# Patient Record
Sex: MALE | Race: WHITE | NOT HISPANIC OR LATINO | Employment: FULL TIME | URBAN - METROPOLITAN AREA
[De-identification: names, ages, dates, MRNs, and addresses within clinical notes are randomized per-mention and may not be internally consistent; named-entity substitution may affect disease eponyms.]

---

## 2024-02-16 ENCOUNTER — OFFICE VISIT (OUTPATIENT)
Dept: ORTHOPEDIC SURGERY | Facility: CLINIC | Age: 44
End: 2024-02-16
Payer: MEDICAID

## 2024-02-16 VITALS — HEIGHT: 70 IN | WEIGHT: 180.4 LBS | BODY MASS INDEX: 25.83 KG/M2 | TEMPERATURE: 98.6 F

## 2024-02-16 DIAGNOSIS — M87.9 OSTEONECROSIS OF BOTH HIPS: ICD-10-CM

## 2024-02-16 DIAGNOSIS — R52 PAIN: Primary | ICD-10-CM

## 2024-02-16 DIAGNOSIS — M87.9 BILATERAL HIP OSTEONECROSIS: ICD-10-CM

## 2024-02-16 PROCEDURE — 99204 OFFICE O/P NEW MOD 45 MIN: CPT | Performed by: ORTHOPAEDIC SURGERY

## 2024-06-06 DIAGNOSIS — M87.9 BILATERAL HIP OSTEONECROSIS: Primary | ICD-10-CM

## 2024-06-13 ENCOUNTER — OFFICE VISIT (OUTPATIENT)
Dept: ORTHOPEDIC SURGERY | Facility: CLINIC | Age: 44
End: 2024-06-13
Payer: COMMERCIAL

## 2024-06-13 VITALS — BODY MASS INDEX: 25.77 KG/M2 | TEMPERATURE: 98 F | WEIGHT: 180 LBS | HEIGHT: 70 IN

## 2024-06-13 DIAGNOSIS — R52 PAIN: ICD-10-CM

## 2024-06-13 DIAGNOSIS — M87.051 AVASCULAR NECROSIS OF BONE OF RIGHT HIP: Primary | ICD-10-CM

## 2024-06-13 PROCEDURE — 99214 OFFICE O/P EST MOD 30 MIN: CPT | Performed by: ORTHOPAEDIC SURGERY

## 2024-06-13 RX ORDER — PREGABALIN 150 MG/1
150 CAPSULE ORAL ONCE
OUTPATIENT
Start: 2024-06-13

## 2024-06-13 RX ORDER — MELOXICAM 7.5 MG/1
15 TABLET ORAL ONCE
OUTPATIENT
Start: 2024-06-13 | End: 2024-06-13

## 2024-06-13 RX ORDER — BUSPIRONE HYDROCHLORIDE 10 MG/1
10 TABLET ORAL
COMMUNITY
Start: 2024-04-29 | End: 2024-07-28

## 2024-06-13 RX ORDER — ACETAMINOPHEN 10 MG/ML
1000 INJECTION, SOLUTION INTRAVENOUS ONCE
OUTPATIENT
Start: 2024-06-13 | End: 2024-06-13

## 2024-06-13 RX ORDER — AMITRIPTYLINE HYDROCHLORIDE 25 MG/1
1 TABLET, FILM COATED ORAL NIGHTLY
COMMUNITY

## 2024-06-13 RX ORDER — CHLORHEXIDINE GLUCONATE 500 MG/1
CLOTH TOPICAL SEE ADMIN INSTRUCTIONS
OUTPATIENT
Start: 2024-06-13

## 2024-06-13 NOTE — PROGRESS NOTES
General Exam    Patient: Brain Ayers    YOB: 1980    Medical Record Number: [id number]    Chief Complaints: Right hip pain    History of Present Illness:     43 y.o. male patient who presents for evaluation right hip pain.  Patient states he has been seen by orthopedics previously and diagnosed with avascular necrosis of the right hip.  He reports that he did get imaging done that was an MRI at [facility] which showed avascular necrotic evidence bilateral hips with the right hip most affected.  Patient states the right hip symptoms have increased in severity and limitations.  Has generalized hip pain decreased motion and limitations in functional status due to symptoms.  At this time he feels that normal daily activities and overall quality life have been diminished because of his symptoms.  He is tried over-the-counter anti-inflammatory medications which are not relieving his symptoms.    Patient states he was a previous heavy drinker but has been sober for about 512 days.  States he is otherwise healthy but has not seen a dentist in some time and uses nicotine pouches.    Denies any numbness or tingling.  Denies any fevers, cough or shortness of breath.    Allergies: No Known Allergies    Home Medications:      Current Outpatient Medications:     amitriptyline (ELAVIL) 25 MG tablet, Take 1 tablet by mouth Every Night., Disp: , Rfl:     busPIRone (BUSPAR) 10 MG tablet, Take 1 tablet by mouth., Disp: , Rfl:     clonazePAM (KlonoPIN) 1 MG tablet, TAKE ONE TABLET BY MOUTH DAILY AS NEEDED FOR ANXIETY *MUST LAST 30 DAYS, Disp: , Rfl:     cyclobenzaprine (FLEXERIL) 10 MG tablet, One half to 1 tab PO tid PRN muscle spasm, Disp: 15 tablet, Rfl: 0    losartan (COZAAR) 50 MG tablet, Take 1 tablet by mouth Daily., Disp: , Rfl:     vitamin D3 125 MCG (5000 UT) capsule capsule, Take 1 capsule by mouth Daily., Disp: , Rfl:     vitamin E 200 UNIT capsule, Take 1 capsule by mouth Daily., Disp: , Rfl:      "QUEtiapine (SEROquel) 50 MG tablet, Take 50 mg by mouth Daily. (Patient not taking: Reported on 2/16/2024), Disp: , Rfl:     venlafaxine XR (EFFEXOR-XR) 150 MG 24 hr capsule, Take 1 capsule by mouth Daily., Disp: , Rfl:     Past Medical History:   Diagnosis Date    Anxiety     Depression     Hypertension        No past surgical history on file.    Social History     Occupational History    Not on file   Tobacco Use    Smoking status: Never     Passive exposure: Never    Smokeless tobacco: Current    Tobacco comments:     Nicotine pouch   Vaping Use    Vaping status: Never Used   Substance and Sexual Activity    Alcohol use: Not Currently    Drug use: Never    Sexual activity: Defer      Social History     Social History Narrative    Not on file       Family History   Problem Relation Age of Onset    Hypertension Mother     Arthritis Mother     No Known Problems Father        Review of Systems:      Constitutional: Denies fever, shaking or chills         All other pertinent positives and negatives as noted above in HPI.    Physical Exam: 43 y.o. male    Vitals:    06/13/24 1131   Temp: 98 °F (36.7 °C)   Weight: 81.6 kg (180 lb)   Height: 177.8 cm (70\")       General:  Patient is awake and alert.  Appears in no acute distress or discomfort.      Musculoskeletal/Extremities:    Right hip examined gentle range of motion decreased due to discomfort.  Positive Stinchfield.  Motor and sensory intact distally.  Antalgic gait unassisted.         Radiology:       Lateral films right hip taken reviewed To evaluate the patient's complaint/s.    Imaging show evidence of avascular necrosis of the right hip with evidence of subchondral collapse that seems to have progressed from previous films.          Assessment: Right hip avascular necrosis with subchondral collapse, left hip avascular necrosis      Plan:      I discussed the findings with the patient his left hip seems to be doing okay for him that will need to be monitored.  " However the right hip there is been increased in symptoms as well as changes suggestive of progression of disease of the right femoral head as noted above.  His symptoms are limiting his normal daily functions overall quality life.  At this time I feel that a total hip replacement is warranted for the patient.  I discussed surgery in detail with the patient including the use of a model as well as risk benefits.  I did discuss risk and benefits with the patient with risk including but not limited to bleeding, infection, damage to nearby nerves, vessels, tendons, ligaments, continued pain, worsening pain, fracture, dislocation, leg length discrepancy, blood clots, even death with anesthesia and possible need for future procedures surgeries.  Patient understood this and has chosen to proceed.    Plan to proceed with right total hip replacement    Patient will need to be nicotine free for weeks prior and 2 weeks after surgery    Patient will require dental clearance.    Patient has an upcoming trip and looking to be scheduled in early September at this time.      Plan for same-day/outpatient surgery.       Plan to send femoral head to pathology        We will plan for follow up as above.    All questions were answered.  Patient understands and agrees with the plan.    Valentin Buck MD    06/13/2024    CC to Dahlia Rhodes APRN

## 2024-06-24 PROBLEM — M87.051 AVASCULAR NECROSIS OF BONE OF RIGHT HIP: Status: ACTIVE | Noted: 2024-06-13

## 2024-08-12 ENCOUNTER — TELEPHONE (OUTPATIENT)
Dept: ORTHOPEDIC SURGERY | Facility: CLINIC | Age: 44
End: 2024-08-12
Payer: COMMERCIAL

## 2024-08-12 NOTE — TELEPHONE ENCOUNTER
Caller: MICHAEL   Relationship to Patient: CLEMENTE  Phone Number: [phone]   Reason for Call:   CALLING ASKING HOW MANY WEEKS OUT TO START THE BABY ASPRIN AND TO STOP THE NICOTINE PATCHES

## 2024-08-13 NOTE — TELEPHONE ENCOUNTER
Have spoke with the patient.  I clarified the patient is not on any aspirin at present.  Have advised him that the aspirin Dr. Buck talked to him about would be started postoperatively.

## 2024-08-13 NOTE — TELEPHONE ENCOUNTER
Tried to call patient and left a voicemail stating per Dr. Buck that Mr. Ayers will need to be nicotine free for four week prior to surgery and two week after surgery. I was able to ask him about the baby Asprin but patients do start baby Asprin post surgery. Patients surgery is 9-

## 2024-08-27 ENCOUNTER — PATIENT MESSAGE (OUTPATIENT)
Dept: ORTHOPEDIC SURGERY | Facility: CLINIC | Age: 44
End: 2024-08-27
Payer: COMMERCIAL

## 2024-08-27 DIAGNOSIS — M87.051 AVASCULAR NECROSIS OF BONE OF RIGHT HIP: ICD-10-CM

## 2024-08-27 DIAGNOSIS — M87.051 AVASCULAR NECROSIS OF BONE OF RIGHT HIP: Primary | ICD-10-CM

## 2024-08-28 DIAGNOSIS — M87.9 BILATERAL HIP OSTEONECROSIS: Primary | ICD-10-CM

## 2024-08-28 DIAGNOSIS — M87.051 AVASCULAR NECROSIS OF BONE OF RIGHT HIP: ICD-10-CM

## 2024-08-28 RX ORDER — TRAMADOL HYDROCHLORIDE 50 MG/1
50 TABLET ORAL EVERY 12 HOURS PRN
Qty: 10 TABLET | Refills: 0 | Status: SHIPPED | OUTPATIENT
Start: 2024-08-28

## 2024-08-28 RX ORDER — TRAMADOL HYDROCHLORIDE 50 MG/1
50 TABLET ORAL EVERY 12 HOURS PRN
Qty: 10 TABLET | Refills: 0 | Status: SHIPPED | OUTPATIENT
Start: 2024-08-28 | End: 2024-08-28 | Stop reason: SDUPTHER

## 2024-09-14 ENCOUNTER — PATIENT MESSAGE (OUTPATIENT)
Dept: ORTHOPEDIC SURGERY | Facility: CLINIC | Age: 44
End: 2024-09-14
Payer: COMMERCIAL

## 2024-09-16 ENCOUNTER — TELEPHONE (OUTPATIENT)
Dept: ORTHOPEDIC SURGERY | Facility: CLINIC | Age: 44
End: 2024-09-16
Payer: COMMERCIAL

## 2024-09-17 ENCOUNTER — OFFICE VISIT (OUTPATIENT)
Dept: ORTHOPEDIC SURGERY | Facility: CLINIC | Age: 44
End: 2024-09-17
Payer: COMMERCIAL

## 2024-09-17 ENCOUNTER — TELEPHONE (OUTPATIENT)
Dept: ORTHOPEDIC SURGERY | Facility: HOSPITAL | Age: 44
End: 2024-09-17
Payer: COMMERCIAL

## 2024-09-17 ENCOUNTER — PRE-ADMISSION TESTING (OUTPATIENT)
Dept: PREADMISSION TESTING | Facility: HOSPITAL | Age: 44
End: 2024-09-17
Payer: COMMERCIAL

## 2024-09-17 VITALS
OXYGEN SATURATION: 99 % | WEIGHT: 183.9 LBS | HEART RATE: 69 BPM | SYSTOLIC BLOOD PRESSURE: 128 MMHG | TEMPERATURE: 97.6 F | BODY MASS INDEX: 26.33 KG/M2 | DIASTOLIC BLOOD PRESSURE: 77 MMHG | RESPIRATION RATE: 16 BRPM | HEIGHT: 70 IN

## 2024-09-17 VITALS — WEIGHT: 178.3 LBS | HEIGHT: 70 IN | TEMPERATURE: 98.6 F | BODY MASS INDEX: 25.53 KG/M2

## 2024-09-17 DIAGNOSIS — M87.051 AVASCULAR NECROSIS OF BONE OF RIGHT HIP: ICD-10-CM

## 2024-09-17 DIAGNOSIS — M25.561 RIGHT KNEE PAIN, UNSPECIFIED CHRONICITY: Primary | ICD-10-CM

## 2024-09-17 LAB
ABO GROUP BLD: NORMAL
ANION GAP SERPL CALCULATED.3IONS-SCNC: 6 MMOL/L (ref 5–15)
BLD GP AB SCN SERPL QL: NEGATIVE
BUN SERPL-MCNC: 11 MG/DL (ref 6–20)
BUN/CREAT SERPL: 13.3 (ref 7–25)
CALCIUM SPEC-SCNC: 9.6 MG/DL (ref 8.6–10.5)
CHLORIDE SERPL-SCNC: 103 MMOL/L (ref 98–107)
CO2 SERPL-SCNC: 30 MMOL/L (ref 22–29)
CREAT SERPL-MCNC: 0.83 MG/DL (ref 0.76–1.27)
DEPRECATED RDW RBC AUTO: 37.6 FL (ref 37–54)
EGFRCR SERPLBLD CKD-EPI 2021: 111.4 ML/MIN/1.73
ERYTHROCYTE [DISTWIDTH] IN BLOOD BY AUTOMATED COUNT: 11.4 % (ref 12.3–15.4)
GLUCOSE SERPL-MCNC: 97 MG/DL (ref 65–99)
HBA1C MFR BLD: 5.6 % (ref 4.8–5.6)
HCT VFR BLD AUTO: 41.9 % (ref 37.5–51)
HGB BLD-MCNC: 13.7 G/DL (ref 13–17.7)
MCH RBC QN AUTO: 29.8 PG (ref 26.6–33)
MCHC RBC AUTO-ENTMCNC: 32.7 G/DL (ref 31.5–35.7)
MCV RBC AUTO: 91.1 FL (ref 79–97)
PLATELET # BLD AUTO: 273 10*3/MM3 (ref 140–450)
PMV BLD AUTO: 9.5 FL (ref 6–12)
POTASSIUM SERPL-SCNC: 4.6 MMOL/L (ref 3.5–5.2)
QT INTERVAL: 395 MS
QTC INTERVAL: 417 MS
RBC # BLD AUTO: 4.6 10*6/MM3 (ref 4.14–5.8)
RH BLD: POSITIVE
SODIUM SERPL-SCNC: 139 MMOL/L (ref 136–145)
T&S EXPIRATION DATE: NORMAL
WBC NRBC COR # BLD AUTO: 5.05 10*3/MM3 (ref 3.4–10.8)

## 2024-09-17 PROCEDURE — G0480 DRUG TEST DEF 1-7 CLASSES: HCPCS

## 2024-09-17 PROCEDURE — 85027 COMPLETE CBC AUTOMATED: CPT

## 2024-09-17 PROCEDURE — 83036 HEMOGLOBIN GLYCOSYLATED A1C: CPT

## 2024-09-17 PROCEDURE — 36415 COLL VENOUS BLD VENIPUNCTURE: CPT

## 2024-09-17 PROCEDURE — 80048 BASIC METABOLIC PNL TOTAL CA: CPT

## 2024-09-17 PROCEDURE — 86900 BLOOD TYPING SEROLOGIC ABO: CPT

## 2024-09-17 PROCEDURE — 86850 RBC ANTIBODY SCREEN: CPT

## 2024-09-17 PROCEDURE — 86901 BLOOD TYPING SEROLOGIC RH(D): CPT

## 2024-09-17 PROCEDURE — 99214 OFFICE O/P EST MOD 30 MIN: CPT | Performed by: ORTHOPAEDIC SURGERY

## 2024-09-17 PROCEDURE — 73562 X-RAY EXAM OF KNEE 3: CPT | Performed by: ORTHOPAEDIC SURGERY

## 2024-09-17 PROCEDURE — 93005 ELECTROCARDIOGRAM TRACING: CPT

## 2024-09-17 PROCEDURE — 20610 DRAIN/INJ JOINT/BURSA W/O US: CPT | Performed by: ORTHOPAEDIC SURGERY

## 2024-09-17 RX ORDER — LIDOCAINE HYDROCHLORIDE 10 MG/ML
2 INJECTION, SOLUTION EPIDURAL; INFILTRATION; INTRACAUDAL; PERINEURAL
Status: COMPLETED | OUTPATIENT
Start: 2024-09-17 | End: 2024-09-17

## 2024-09-17 RX ORDER — METHYLPREDNISOLONE ACETATE 80 MG/ML
80 INJECTION, SUSPENSION INTRA-ARTICULAR; INTRALESIONAL; INTRAMUSCULAR; SOFT TISSUE
Status: COMPLETED | OUTPATIENT
Start: 2024-09-17 | End: 2024-09-17

## 2024-09-17 RX ORDER — AMITRIPTYLINE HYDROCHLORIDE 10 MG/1
10-20 TABLET ORAL DAILY
COMMUNITY
Start: 2024-07-18 | End: 2025-07-18

## 2024-09-17 RX ORDER — ACETAMINOPHEN 500 MG
500 TABLET ORAL EVERY 6 HOURS PRN
COMMUNITY

## 2024-09-17 RX ORDER — NAPROXEN SODIUM 220 MG
220 TABLET ORAL 2 TIMES DAILY WITH MEALS
COMMUNITY

## 2024-09-17 RX ADMIN — METHYLPREDNISOLONE ACETATE 80 MG: 80 INJECTION, SUSPENSION INTRA-ARTICULAR; INTRALESIONAL; INTRAMUSCULAR; SOFT TISSUE at 14:05

## 2024-09-17 RX ADMIN — LIDOCAINE HYDROCHLORIDE 2 ML: 10 INJECTION, SOLUTION EPIDURAL; INFILTRATION; INTRACAUDAL; PERINEURAL at 14:05

## 2024-09-23 ENCOUNTER — OFFICE VISIT (OUTPATIENT)
Dept: ORTHOPEDIC SURGERY | Facility: CLINIC | Age: 44
End: 2024-09-23
Payer: COMMERCIAL

## 2024-09-23 VITALS — BODY MASS INDEX: 25.83 KG/M2 | WEIGHT: 180.4 LBS | HEIGHT: 70 IN | TEMPERATURE: 98.9 F

## 2024-09-23 DIAGNOSIS — M87.051 AVASCULAR NECROSIS OF BONE OF RIGHT HIP: Primary | ICD-10-CM

## 2024-09-23 LAB
COTININE SERPL-MCNC: <1 NG/ML
NICOTINE SERPL-MCNC: <1 NG/ML

## 2024-09-23 PROCEDURE — S0260 H&P FOR SURGERY: HCPCS | Performed by: ORTHOPAEDIC SURGERY

## 2024-09-25 ENCOUNTER — HOME HEALTH ADMISSION (OUTPATIENT)
Dept: HOME HEALTH SERVICES | Facility: HOME HEALTHCARE | Age: 44
End: 2024-09-25
Payer: COMMERCIAL

## 2024-09-25 ENCOUNTER — APPOINTMENT (OUTPATIENT)
Dept: GENERAL RADIOLOGY | Facility: HOSPITAL | Age: 44
End: 2024-09-25
Payer: COMMERCIAL

## 2024-09-25 ENCOUNTER — DOCUMENTATION (OUTPATIENT)
Dept: HOME HEALTH SERVICES | Facility: HOME HEALTHCARE | Age: 44
End: 2024-09-25
Payer: COMMERCIAL

## 2024-09-25 ENCOUNTER — ANESTHESIA EVENT (OUTPATIENT)
Dept: PERIOP | Facility: HOSPITAL | Age: 44
End: 2024-09-25
Payer: COMMERCIAL

## 2024-09-25 ENCOUNTER — ANESTHESIA (OUTPATIENT)
Dept: PERIOP | Facility: HOSPITAL | Age: 44
End: 2024-09-25
Payer: COMMERCIAL

## 2024-09-25 ENCOUNTER — NURSE TRIAGE (OUTPATIENT)
Dept: CALL CENTER | Facility: HOSPITAL | Age: 44
End: 2024-09-25
Payer: COMMERCIAL

## 2024-09-25 ENCOUNTER — HOSPITAL ENCOUNTER (OUTPATIENT)
Facility: HOSPITAL | Age: 44
Setting detail: HOSPITAL OUTPATIENT SURGERY
Discharge: HOME | End: 2024-09-25
Attending: ORTHOPAEDIC SURGERY | Admitting: ORTHOPAEDIC SURGERY
Payer: COMMERCIAL

## 2024-09-25 VITALS
RESPIRATION RATE: 18 BRPM | OXYGEN SATURATION: 97 % | DIASTOLIC BLOOD PRESSURE: 66 MMHG | TEMPERATURE: 97.8 F | SYSTOLIC BLOOD PRESSURE: 122 MMHG | HEART RATE: 80 BPM

## 2024-09-25 DIAGNOSIS — M87.051 AVASCULAR NECROSIS OF BONE OF RIGHT HIP: Primary | ICD-10-CM

## 2024-09-25 PROCEDURE — 25010000002 CEFAZOLIN PER 500 MG: Performed by: ORTHOPAEDIC SURGERY

## 2024-09-25 PROCEDURE — 25010000002 SUGAMMADEX 200 MG/2ML SOLUTION: Performed by: NURSE ANESTHETIST, CERTIFIED REGISTERED

## 2024-09-25 PROCEDURE — 25010000002 MAGNESIUM SULFATE PER 500 MG OF MAGNESIUM: Performed by: NURSE ANESTHETIST, CERTIFIED REGISTERED

## 2024-09-25 PROCEDURE — 25010000002 VANCOMYCIN HCL 1.25 G RECONSTITUTED SOLUTION 1 EACH VIAL: Performed by: ORTHOPAEDIC SURGERY

## 2024-09-25 PROCEDURE — C1776 JOINT DEVICE (IMPLANTABLE): HCPCS | Performed by: ORTHOPAEDIC SURGERY

## 2024-09-25 PROCEDURE — 97161 PT EVAL LOW COMPLEX 20 MIN: CPT

## 2024-09-25 PROCEDURE — 25010000002 GLYCOPYRROLATE 1 MG/5ML SOLUTION: Performed by: NURSE ANESTHETIST, CERTIFIED REGISTERED

## 2024-09-25 PROCEDURE — 25010000002 PROPOFOL 10 MG/ML EMULSION: Performed by: NURSE ANESTHETIST, CERTIFIED REGISTERED

## 2024-09-25 PROCEDURE — 25010000002 FENTANYL CITRATE (PF) 50 MCG/ML SOLUTION: Performed by: NURSE ANESTHETIST, CERTIFIED REGISTERED

## 2024-09-25 PROCEDURE — 25810000003 LACTATED RINGERS SOLUTION: Performed by: ORTHOPAEDIC SURGERY

## 2024-09-25 PROCEDURE — 88311 DECALCIFY TISSUE: CPT | Performed by: ORTHOPAEDIC SURGERY

## 2024-09-25 PROCEDURE — 25810000003 SODIUM CHLORIDE 0.9 % SOLUTION 250 ML FLEX CONT: Performed by: ORTHOPAEDIC SURGERY

## 2024-09-25 PROCEDURE — 73501 X-RAY EXAM HIP UNI 1 VIEW: CPT

## 2024-09-25 PROCEDURE — 25010000002 ROPIVACAINE PER 1 MG: Performed by: ORTHOPAEDIC SURGERY

## 2024-09-25 PROCEDURE — 88304 TISSUE EXAM BY PATHOLOGIST: CPT | Performed by: ORTHOPAEDIC SURGERY

## 2024-09-25 PROCEDURE — 25810000003 LACTATED RINGERS PER 1000 ML: Performed by: NURSE ANESTHETIST, CERTIFIED REGISTERED

## 2024-09-25 PROCEDURE — 25010000002 HYDROMORPHONE 1 MG/ML SOLUTION: Performed by: NURSE ANESTHETIST, CERTIFIED REGISTERED

## 2024-09-25 PROCEDURE — 25010000002 ACETAMINOPHEN 10 MG/ML SOLUTION: Performed by: NURSE ANESTHETIST, CERTIFIED REGISTERED

## 2024-09-25 PROCEDURE — 76000 FLUOROSCOPY <1 HR PHYS/QHP: CPT

## 2024-09-25 PROCEDURE — S0260 H&P FOR SURGERY: HCPCS | Performed by: ORTHOPAEDIC SURGERY

## 2024-09-25 PROCEDURE — 27130 TOTAL HIP ARTHROPLASTY: CPT | Performed by: ORTHOPAEDIC SURGERY

## 2024-09-25 PROCEDURE — 25010000002 MIDAZOLAM PER 1 MG: Performed by: STUDENT IN AN ORGANIZED HEALTH CARE EDUCATION/TRAINING PROGRAM

## 2024-09-25 PROCEDURE — 25010000002 KETOROLAC TROMETHAMINE PER 15 MG: Performed by: ORTHOPAEDIC SURGERY

## 2024-09-25 PROCEDURE — 25010000002 HYDROMORPHONE PER 4 MG: Performed by: NURSE ANESTHETIST, CERTIFIED REGISTERED

## 2024-09-25 PROCEDURE — 27130 TOTAL HIP ARTHROPLASTY: CPT | Performed by: TECHNICIAN, OTHER

## 2024-09-25 PROCEDURE — 97116 GAIT TRAINING THERAPY: CPT

## 2024-09-25 PROCEDURE — 25010000002 ONDANSETRON PER 1 MG: Performed by: NURSE ANESTHETIST, CERTIFIED REGISTERED

## 2024-09-25 PROCEDURE — 25010000002 EPINEPHRINE 1 MG/ML SOLUTION 30 ML VIAL: Performed by: ORTHOPAEDIC SURGERY

## 2024-09-25 PROCEDURE — 25010000002 DEXAMETHASONE SODIUM PHOSPHATE 20 MG/5ML SOLUTION: Performed by: NURSE ANESTHETIST, CERTIFIED REGISTERED

## 2024-09-25 PROCEDURE — 25010000002 MORPHINE PER 10 MG: Performed by: ORTHOPAEDIC SURGERY

## 2024-09-25 DEVICE — STEM FEM/HIP AVENIRCOMPLETE COLAR STFF HA SZ5: Type: IMPLANTABLE DEVICE | Site: HIP | Status: FUNCTIONAL

## 2024-09-25 DEVICE — BIOLOX® DELTA, CERAMIC FEMORAL HEAD, L, Ø 36/+3.5, TAPER 12/14
Type: IMPLANTABLE DEVICE | Site: HIP | Status: FUNCTIONAL
Brand: BIOLOX® DELTA

## 2024-09-25 DEVICE — KNOTLESS TISSUE CONTROL DEVICE, UNDYED UNIDIRECTIONAL (ANTIBACTERIAL) SYNTHETIC ABSORBABLE DEVICE
Type: IMPLANTABLE DEVICE | Site: HIP | Status: FUNCTIONAL
Brand: STRATAFIX

## 2024-09-25 DEVICE — CP HIP UPCHRG OSSEOTI LTD HL CUPS: Type: IMPLANTABLE DEVICE | Site: HIP | Status: FUNCTIONAL

## 2024-09-25 DEVICE — IMPLANTABLE DEVICE
Type: IMPLANTABLE DEVICE | Site: HIP | Status: FUNCTIONAL
Brand: G7® VIVACIT-E®

## 2024-09-25 DEVICE — KNOTLESS TISSUE CONTROL DEVICE, VIOLET UNIDIRECTIONAL (ANTIBACTERIAL) SYNTHETIC ABSORBABLE DEVICE
Type: IMPLANTABLE DEVICE | Site: HIP | Status: FUNCTIONAL
Brand: STRATAFIX

## 2024-09-25 DEVICE — TOTAL HIP PRIMARY: Type: IMPLANTABLE DEVICE | Status: FUNCTIONAL

## 2024-09-25 DEVICE — IMPLANTABLE DEVICE
Type: IMPLANTABLE DEVICE | Site: HIP | Status: FUNCTIONAL
Brand: G7® ACETABULAR SYSTEM

## 2024-09-25 RX ORDER — PHENYLEPHRINE HCL IN 0.9% NACL 1 MG/10 ML
SYRINGE (ML) INTRAVENOUS AS NEEDED
Status: DISCONTINUED | OUTPATIENT
Start: 2024-09-25 | End: 2024-09-25 | Stop reason: SURG

## 2024-09-25 RX ORDER — FENTANYL CITRATE 50 UG/ML
INJECTION, SOLUTION INTRAMUSCULAR; INTRAVENOUS AS NEEDED
Status: DISCONTINUED | OUTPATIENT
Start: 2024-09-25 | End: 2024-09-25 | Stop reason: SURG

## 2024-09-25 RX ORDER — PROMETHAZINE HYDROCHLORIDE 25 MG/1
25 SUPPOSITORY RECTAL ONCE AS NEEDED
Status: DISCONTINUED | OUTPATIENT
Start: 2024-09-25 | End: 2024-09-26 | Stop reason: HOSPADM

## 2024-09-25 RX ORDER — FENTANYL CITRATE 50 UG/ML
50 INJECTION, SOLUTION INTRAMUSCULAR; INTRAVENOUS ONCE AS NEEDED
Status: DISCONTINUED | OUTPATIENT
Start: 2024-09-25 | End: 2024-09-25 | Stop reason: HOSPADM

## 2024-09-25 RX ORDER — HYDROCODONE BITARTRATE AND ACETAMINOPHEN 5; 325 MG/1; MG/1
1 TABLET ORAL ONCE AS NEEDED
Status: DISCONTINUED | OUTPATIENT
Start: 2024-09-25 | End: 2024-09-26 | Stop reason: HOSPADM

## 2024-09-25 RX ORDER — NALOXONE HCL 0.4 MG/ML
0.2 VIAL (ML) INJECTION AS NEEDED
Status: DISCONTINUED | OUTPATIENT
Start: 2024-09-25 | End: 2024-09-26 | Stop reason: HOSPADM

## 2024-09-25 RX ORDER — SODIUM CHLORIDE 0.9 % (FLUSH) 0.9 %
3-10 SYRINGE (ML) INJECTION AS NEEDED
Status: DISCONTINUED | OUTPATIENT
Start: 2024-09-25 | End: 2024-09-25 | Stop reason: HOSPADM

## 2024-09-25 RX ORDER — HYDROCODONE BITARTRATE AND ACETAMINOPHEN 7.5; 325 MG/1; MG/1
2 TABLET ORAL EVERY 4 HOURS PRN
Status: CANCELLED | OUTPATIENT
Start: 2024-09-25 | End: 2024-10-02

## 2024-09-25 RX ORDER — HYDROCODONE BITARTRATE AND ACETAMINOPHEN 7.5; 325 MG/1; MG/1
1-2 TABLET ORAL EVERY 4 HOURS PRN
Qty: 28 TABLET | Refills: 0 | Status: SHIPPED | OUTPATIENT
Start: 2024-09-25

## 2024-09-25 RX ORDER — ASPIRIN 81 MG/1
81 TABLET ORAL 2 TIMES DAILY
Qty: 60 TABLET | Refills: 0 | Status: SHIPPED | OUTPATIENT
Start: 2024-09-26 | End: 2024-10-26

## 2024-09-25 RX ORDER — MAGNESIUM SULFATE HEPTAHYDRATE 500 MG/ML
INJECTION, SOLUTION INTRAMUSCULAR; INTRAVENOUS AS NEEDED
Status: DISCONTINUED | OUTPATIENT
Start: 2024-09-25 | End: 2024-09-25 | Stop reason: SURG

## 2024-09-25 RX ORDER — SODIUM CHLORIDE, SODIUM LACTATE, POTASSIUM CHLORIDE, CALCIUM CHLORIDE 600; 310; 30; 20 MG/100ML; MG/100ML; MG/100ML; MG/100ML
INJECTION, SOLUTION INTRAVENOUS CONTINUOUS PRN
Status: DISCONTINUED | OUTPATIENT
Start: 2024-09-25 | End: 2024-09-25 | Stop reason: SURG

## 2024-09-25 RX ORDER — HYDRALAZINE HYDROCHLORIDE 20 MG/ML
5 INJECTION INTRAMUSCULAR; INTRAVENOUS
Status: DISCONTINUED | OUTPATIENT
Start: 2024-09-25 | End: 2024-09-26 | Stop reason: HOSPADM

## 2024-09-25 RX ORDER — FENTANYL CITRATE 50 UG/ML
50 INJECTION, SOLUTION INTRAMUSCULAR; INTRAVENOUS
Status: DISCONTINUED | OUTPATIENT
Start: 2024-09-25 | End: 2024-09-26 | Stop reason: HOSPADM

## 2024-09-25 RX ORDER — LABETALOL HYDROCHLORIDE 5 MG/ML
5 INJECTION, SOLUTION INTRAVENOUS
Status: DISCONTINUED | OUTPATIENT
Start: 2024-09-25 | End: 2024-09-26 | Stop reason: HOSPADM

## 2024-09-25 RX ORDER — MIDAZOLAM HYDROCHLORIDE 1 MG/ML
1 INJECTION INTRAMUSCULAR; INTRAVENOUS
Status: DISCONTINUED | OUTPATIENT
Start: 2024-09-25 | End: 2024-09-25 | Stop reason: HOSPADM

## 2024-09-25 RX ORDER — DIPHENHYDRAMINE HYDROCHLORIDE 50 MG/ML
12.5 INJECTION INTRAMUSCULAR; INTRAVENOUS
Status: DISCONTINUED | OUTPATIENT
Start: 2024-09-25 | End: 2024-09-26 | Stop reason: HOSPADM

## 2024-09-25 RX ORDER — LIDOCAINE HYDROCHLORIDE 10 MG/ML
0.5 INJECTION, SOLUTION INFILTRATION; PERINEURAL ONCE AS NEEDED
Status: DISCONTINUED | OUTPATIENT
Start: 2024-09-25 | End: 2024-09-25 | Stop reason: HOSPADM

## 2024-09-25 RX ORDER — SODIUM CHLORIDE, SODIUM LACTATE, POTASSIUM CHLORIDE, CALCIUM CHLORIDE 600; 310; 30; 20 MG/100ML; MG/100ML; MG/100ML; MG/100ML
9 INJECTION, SOLUTION INTRAVENOUS CONTINUOUS
Status: DISCONTINUED | OUTPATIENT
Start: 2024-09-25 | End: 2024-09-26 | Stop reason: HOSPADM

## 2024-09-25 RX ORDER — MELOXICAM 15 MG/1
15 TABLET ORAL ONCE
Status: COMPLETED | OUTPATIENT
Start: 2024-09-25 | End: 2024-09-25

## 2024-09-25 RX ORDER — ONDANSETRON 2 MG/ML
INJECTION INTRAMUSCULAR; INTRAVENOUS AS NEEDED
Status: DISCONTINUED | OUTPATIENT
Start: 2024-09-25 | End: 2024-09-25 | Stop reason: SURG

## 2024-09-25 RX ORDER — MELOXICAM 15 MG/1
15 TABLET ORAL DAILY
Status: CANCELLED | OUTPATIENT
Start: 2024-09-25

## 2024-09-25 RX ORDER — PROMETHAZINE HYDROCHLORIDE 25 MG/1
25 TABLET ORAL ONCE AS NEEDED
Status: DISCONTINUED | OUTPATIENT
Start: 2024-09-25 | End: 2024-09-26 | Stop reason: HOSPADM

## 2024-09-25 RX ORDER — LIDOCAINE HYDROCHLORIDE 20 MG/ML
INJECTION, SOLUTION INFILTRATION; PERINEURAL AS NEEDED
Status: DISCONTINUED | OUTPATIENT
Start: 2024-09-25 | End: 2024-09-25 | Stop reason: SURG

## 2024-09-25 RX ORDER — POLYETHYLENE GLYCOL 3350 17 G/17G
17 POWDER, FOR SOLUTION ORAL 2 TIMES DAILY
Qty: 238 G | Refills: 0 | Status: SHIPPED | OUTPATIENT
Start: 2024-09-25 | End: 2024-10-02

## 2024-09-25 RX ORDER — ONDANSETRON 4 MG/1
4 TABLET, FILM COATED ORAL EVERY 8 HOURS PRN
Qty: 8 TABLET | Refills: 0 | Status: SHIPPED | OUTPATIENT
Start: 2024-09-25

## 2024-09-25 RX ORDER — GLYCOPYRROLATE 0.2 MG/ML
INJECTION INTRAMUSCULAR; INTRAVENOUS AS NEEDED
Status: DISCONTINUED | OUTPATIENT
Start: 2024-09-25 | End: 2024-09-25 | Stop reason: SURG

## 2024-09-25 RX ORDER — ASPIRIN 81 MG/1
81 TABLET ORAL EVERY 12 HOURS SCHEDULED
Status: CANCELLED | OUTPATIENT
Start: 2024-09-26

## 2024-09-25 RX ORDER — SODIUM CHLORIDE 0.9 % (FLUSH) 0.9 %
3 SYRINGE (ML) INJECTION EVERY 12 HOURS SCHEDULED
Status: DISCONTINUED | OUTPATIENT
Start: 2024-09-25 | End: 2024-09-25 | Stop reason: HOSPADM

## 2024-09-25 RX ORDER — PROPOFOL 10 MG/ML
VIAL (ML) INTRAVENOUS AS NEEDED
Status: DISCONTINUED | OUTPATIENT
Start: 2024-09-25 | End: 2024-09-25 | Stop reason: SURG

## 2024-09-25 RX ORDER — DEXAMETHASONE SODIUM PHOSPHATE 4 MG/ML
INJECTION, SOLUTION INTRA-ARTICULAR; INTRALESIONAL; INTRAMUSCULAR; INTRAVENOUS; SOFT TISSUE AS NEEDED
Status: DISCONTINUED | OUTPATIENT
Start: 2024-09-25 | End: 2024-09-25 | Stop reason: SURG

## 2024-09-25 RX ORDER — MAGNESIUM HYDROXIDE 1200 MG/15ML
LIQUID ORAL AS NEEDED
Status: DISCONTINUED | OUTPATIENT
Start: 2024-09-25 | End: 2024-09-25 | Stop reason: HOSPADM

## 2024-09-25 RX ORDER — OXYCODONE AND ACETAMINOPHEN 7.5; 325 MG/1; MG/1
1 TABLET ORAL EVERY 4 HOURS PRN
Status: DISCONTINUED | OUTPATIENT
Start: 2024-09-25 | End: 2024-09-26 | Stop reason: HOSPADM

## 2024-09-25 RX ORDER — TRANEXAMIC ACID 100 MG/ML
INJECTION, SOLUTION INTRAVENOUS AS NEEDED
Status: DISCONTINUED | OUTPATIENT
Start: 2024-09-25 | End: 2024-09-25 | Stop reason: SURG

## 2024-09-25 RX ORDER — ROCURONIUM BROMIDE 10 MG/ML
INJECTION, SOLUTION INTRAVENOUS AS NEEDED
Status: DISCONTINUED | OUTPATIENT
Start: 2024-09-25 | End: 2024-09-25 | Stop reason: SURG

## 2024-09-25 RX ORDER — ACETAMINOPHEN 10 MG/ML
INJECTION, SOLUTION INTRAVENOUS AS NEEDED
Status: DISCONTINUED | OUTPATIENT
Start: 2024-09-25 | End: 2024-09-25 | Stop reason: SURG

## 2024-09-25 RX ORDER — PREGABALIN 75 MG/1
150 CAPSULE ORAL ONCE
Status: COMPLETED | OUTPATIENT
Start: 2024-09-25 | End: 2024-09-25

## 2024-09-25 RX ORDER — HYDROMORPHONE HYDROCHLORIDE 1 MG/ML
0.5 INJECTION, SOLUTION INTRAMUSCULAR; INTRAVENOUS; SUBCUTANEOUS
Status: DISCONTINUED | OUTPATIENT
Start: 2024-09-25 | End: 2024-09-26 | Stop reason: HOSPADM

## 2024-09-25 RX ORDER — DROPERIDOL 2.5 MG/ML
0.62 INJECTION, SOLUTION INTRAMUSCULAR; INTRAVENOUS
Status: DISCONTINUED | OUTPATIENT
Start: 2024-09-25 | End: 2024-09-26 | Stop reason: HOSPADM

## 2024-09-25 RX ORDER — MELOXICAM 15 MG/1
15 TABLET ORAL DAILY
Qty: 10 TABLET | Refills: 0 | Status: SHIPPED | OUTPATIENT
Start: 2024-09-25 | End: 2024-10-05

## 2024-09-25 RX ORDER — PANTOPRAZOLE SODIUM 40 MG/1
40 TABLET, DELAYED RELEASE ORAL DAILY
Qty: 10 TABLET | Refills: 0 | Status: SHIPPED | OUTPATIENT
Start: 2024-09-25 | End: 2024-10-05

## 2024-09-25 RX ORDER — IPRATROPIUM BROMIDE AND ALBUTEROL SULFATE 2.5; .5 MG/3ML; MG/3ML
3 SOLUTION RESPIRATORY (INHALATION) ONCE AS NEEDED
Status: DISCONTINUED | OUTPATIENT
Start: 2024-09-25 | End: 2024-09-26 | Stop reason: HOSPADM

## 2024-09-25 RX ORDER — DOCUSATE SODIUM 100 MG/1
100 CAPSULE, LIQUID FILLED ORAL 2 TIMES DAILY
Qty: 60 CAPSULE | Refills: 0 | Status: SHIPPED | OUTPATIENT
Start: 2024-09-25

## 2024-09-25 RX ORDER — EPHEDRINE SULFATE 50 MG/ML
5 INJECTION, SOLUTION INTRAVENOUS ONCE AS NEEDED
Status: DISCONTINUED | OUTPATIENT
Start: 2024-09-25 | End: 2024-09-26 | Stop reason: HOSPADM

## 2024-09-25 RX ORDER — FLUMAZENIL 0.1 MG/ML
0.2 INJECTION INTRAVENOUS AS NEEDED
Status: DISCONTINUED | OUTPATIENT
Start: 2024-09-25 | End: 2024-09-26 | Stop reason: HOSPADM

## 2024-09-25 RX ORDER — HYDROCODONE BITARTRATE AND ACETAMINOPHEN 7.5; 325 MG/1; MG/1
1 TABLET ORAL EVERY 4 HOURS PRN
Status: CANCELLED | OUTPATIENT
Start: 2024-09-25 | End: 2024-10-02

## 2024-09-25 RX ORDER — ONDANSETRON 2 MG/ML
4 INJECTION INTRAMUSCULAR; INTRAVENOUS ONCE AS NEEDED
Status: DISCONTINUED | OUTPATIENT
Start: 2024-09-25 | End: 2024-09-26 | Stop reason: HOSPADM

## 2024-09-25 RX ADMIN — Medication 150 MCG: at 08:09

## 2024-09-25 RX ADMIN — HYDROMORPHONE HYDROCHLORIDE 0.5 MG: 1 INJECTION, SOLUTION INTRAMUSCULAR; INTRAVENOUS; SUBCUTANEOUS at 10:04

## 2024-09-25 RX ADMIN — GLYCOPYRROLATE 0.1 MG: 0.2 INJECTION INTRAMUSCULAR; INTRAVENOUS at 07:27

## 2024-09-25 RX ADMIN — MAGNESIUM SULFATE HEPTAHYDRATE 2 G: 500 INJECTION, SOLUTION INTRAMUSCULAR; INTRAVENOUS at 07:31

## 2024-09-25 RX ADMIN — MELOXICAM 15 MG: 15 TABLET ORAL at 05:52

## 2024-09-25 RX ADMIN — Medication 150 MCG: at 08:34

## 2024-09-25 RX ADMIN — ROCURONIUM BROMIDE 50 MG: 10 INJECTION, SOLUTION INTRAVENOUS at 07:18

## 2024-09-25 RX ADMIN — PREGABALIN 150 MG: 75 CAPSULE ORAL at 05:52

## 2024-09-25 RX ADMIN — HYDROMORPHONE HYDROCHLORIDE 0.5 MG: 1 INJECTION, SOLUTION INTRAMUSCULAR; INTRAVENOUS; SUBCUTANEOUS at 07:18

## 2024-09-25 RX ADMIN — ROCURONIUM BROMIDE 10 MG: 10 INJECTION, SOLUTION INTRAVENOUS at 07:55

## 2024-09-25 RX ADMIN — FENTANYL CITRATE 50 MCG: 50 INJECTION, SOLUTION INTRAMUSCULAR; INTRAVENOUS at 09:33

## 2024-09-25 RX ADMIN — SODIUM CHLORIDE 2 G: 900 INJECTION INTRAVENOUS at 07:04

## 2024-09-25 RX ADMIN — SODIUM CHLORIDE, POTASSIUM CHLORIDE, SODIUM LACTATE AND CALCIUM CHLORIDE 500 ML: 600; 310; 30; 20 INJECTION, SOLUTION INTRAVENOUS at 06:08

## 2024-09-25 RX ADMIN — PROPOFOL 200 MG: 10 INJECTION, EMULSION INTRAVENOUS at 07:18

## 2024-09-25 RX ADMIN — LIDOCAINE HYDROCHLORIDE 100 MG: 20 INJECTION, SOLUTION EPIDURAL; INFILTRATION; INTRACAUDAL; PERINEURAL at 07:18

## 2024-09-25 RX ADMIN — DEXAMETHASONE SODIUM PHOSPHATE 8 MG: 4 INJECTION, SOLUTION INTRAMUSCULAR; INTRAVENOUS at 07:27

## 2024-09-25 RX ADMIN — ACETAMINOPHEN 1000 MG: 10 INJECTION INTRAVENOUS at 07:30

## 2024-09-25 RX ADMIN — HYDROMORPHONE HYDROCHLORIDE 0.5 MG: 1 INJECTION, SOLUTION INTRAMUSCULAR; INTRAVENOUS; SUBCUTANEOUS at 08:52

## 2024-09-25 RX ADMIN — ROCURONIUM BROMIDE 10 MG: 10 INJECTION, SOLUTION INTRAVENOUS at 08:17

## 2024-09-25 RX ADMIN — ONDANSETRON 4 MG: 2 INJECTION INTRAMUSCULAR; INTRAVENOUS at 09:33

## 2024-09-25 RX ADMIN — MIDAZOLAM 1 MG: 1 INJECTION INTRAMUSCULAR; INTRAVENOUS at 06:20

## 2024-09-25 RX ADMIN — OXYCODONE HYDROCHLORIDE AND ACETAMINOPHEN 1 TABLET: 7.5; 325 TABLET ORAL at 09:58

## 2024-09-25 RX ADMIN — Medication 150 MCG: at 08:38

## 2024-09-25 RX ADMIN — TRANEXAMIC ACID 1000 MG: 1 INJECTION, SOLUTION INTRAVENOUS at 09:05

## 2024-09-25 RX ADMIN — SUGAMMADEX 200 MG: 100 INJECTION, SOLUTION INTRAVENOUS at 09:33

## 2024-09-25 RX ADMIN — FENTANYL CITRATE 50 MCG: 50 INJECTION, SOLUTION INTRAMUSCULAR; INTRAVENOUS at 09:58

## 2024-09-25 RX ADMIN — HYDROMORPHONE HYDROCHLORIDE 0.5 MG: 1 INJECTION, SOLUTION INTRAMUSCULAR; INTRAVENOUS; SUBCUTANEOUS at 10:16

## 2024-09-25 RX ADMIN — SODIUM CHLORIDE, SODIUM LACTATE, POTASSIUM CHLORIDE, AND CALCIUM CHLORIDE: 600; 310; 30; 20 INJECTION, SOLUTION INTRAVENOUS at 07:11

## 2024-09-25 RX ADMIN — TRANEXAMIC ACID 1000 MG: 1 INJECTION, SOLUTION INTRAVENOUS at 07:44

## 2024-09-25 RX ADMIN — VANCOMYCIN HYDROCHLORIDE 1250 MG: 1.25 INJECTION, POWDER, LYOPHILIZED, FOR SOLUTION INTRAVENOUS at 06:26

## 2024-09-25 RX ADMIN — PROPOFOL 25 MCG/KG/MIN: 10 INJECTION, EMULSION INTRAVENOUS at 07:35

## 2024-09-26 ENCOUNTER — TELEPHONE (OUTPATIENT)
Dept: ORTHOPEDIC SURGERY | Facility: CLINIC | Age: 44
End: 2024-09-26

## 2024-09-26 ENCOUNTER — HOME CARE VISIT (OUTPATIENT)
Dept: HOME HEALTH SERVICES | Facility: HOME HEALTHCARE | Age: 44
End: 2024-09-26
Payer: COMMERCIAL

## 2024-09-26 VITALS
OXYGEN SATURATION: 98 % | DIASTOLIC BLOOD PRESSURE: 69 MMHG | SYSTOLIC BLOOD PRESSURE: 126 MMHG | HEART RATE: 97 BPM | TEMPERATURE: 97.1 F

## 2024-09-26 DIAGNOSIS — Z96.641 STATUS POST TOTAL REPLACEMENT OF RIGHT HIP: Primary | ICD-10-CM

## 2024-09-26 PROCEDURE — G0151 HHCP-SERV OF PT,EA 15 MIN: HCPCS

## 2024-09-26 RX ORDER — OXYCODONE AND ACETAMINOPHEN 7.5; 325 MG/1; MG/1
1 TABLET ORAL EVERY 4 HOURS PRN
Qty: 28 TABLET | Refills: 0 | Status: SHIPPED | OUTPATIENT
Start: 2024-09-26 | End: 2024-09-30 | Stop reason: SDUPTHER

## 2024-09-26 NOTE — TELEPHONE ENCOUNTER
Hub staff attempted to follow warm transfer process and was unsuccessful     Caller: YOSVANY HUI    Relationship to patient: Emergency Contact    Best call back number: [phone]    Patient is needing: YOSVANY STATES THAT PATIENT IS HAVING EXTREME PAIN IN HIS RIGHT KNEE. SHE STATES PAIN IS WORSE THAN BEFORE HE HAD HIS LISA. SHE IS ASKING IF THIS IS NORMAL.

## 2024-09-27 ENCOUNTER — HOME CARE VISIT (OUTPATIENT)
Dept: HOME HEALTH SERVICES | Facility: HOME HEALTHCARE | Age: 44
End: 2024-09-27
Payer: COMMERCIAL

## 2024-09-27 ENCOUNTER — TELEPHONE (OUTPATIENT)
Dept: ORTHOPEDIC SURGERY | Facility: HOSPITAL | Age: 44
End: 2024-09-27
Payer: COMMERCIAL

## 2024-09-27 VITALS
OXYGEN SATURATION: 97 % | HEART RATE: 74 BPM | SYSTOLIC BLOOD PRESSURE: 120 MMHG | DIASTOLIC BLOOD PRESSURE: 70 MMHG | TEMPERATURE: 97.3 F

## 2024-09-27 LAB
CYTO UR: NORMAL
LAB AP CASE REPORT: NORMAL
LAB AP CLINICAL INFORMATION: NORMAL
PATH REPORT.FINAL DX SPEC: NORMAL
PATH REPORT.GROSS SPEC: NORMAL

## 2024-09-27 PROCEDURE — G0151 HHCP-SERV OF PT,EA 15 MIN: HCPCS

## 2024-09-30 DIAGNOSIS — Z96.641 STATUS POST TOTAL REPLACEMENT OF RIGHT HIP: ICD-10-CM

## 2024-09-30 RX ORDER — OXYCODONE AND ACETAMINOPHEN 7.5; 325 MG/1; MG/1
1 TABLET ORAL EVERY 4 HOURS PRN
Qty: 28 TABLET | Refills: 0 | Status: SHIPPED | OUTPATIENT
Start: 2024-09-30 | End: 2024-10-03 | Stop reason: SDUPTHER

## 2024-10-01 ENCOUNTER — HOME CARE VISIT (OUTPATIENT)
Dept: HOME HEALTH SERVICES | Facility: HOME HEALTHCARE | Age: 44
End: 2024-10-01
Payer: COMMERCIAL

## 2024-10-01 VITALS
SYSTOLIC BLOOD PRESSURE: 133 MMHG | DIASTOLIC BLOOD PRESSURE: 78 MMHG | OXYGEN SATURATION: 98 % | HEART RATE: 78 BPM | TEMPERATURE: 97.1 F

## 2024-10-01 PROCEDURE — G0151 HHCP-SERV OF PT,EA 15 MIN: HCPCS

## 2024-10-03 DIAGNOSIS — Z96.641 STATUS POST TOTAL REPLACEMENT OF RIGHT HIP: ICD-10-CM

## 2024-10-03 RX ORDER — OXYCODONE AND ACETAMINOPHEN 7.5; 325 MG/1; MG/1
1 TABLET ORAL EVERY 6 HOURS PRN
Qty: 28 TABLET | Refills: 0 | Status: SHIPPED | OUTPATIENT
Start: 2024-10-03

## 2024-10-04 ENCOUNTER — HOME CARE VISIT (OUTPATIENT)
Dept: HOME HEALTH SERVICES | Facility: HOME HEALTHCARE | Age: 44
End: 2024-10-04
Payer: COMMERCIAL

## 2024-10-04 VITALS
HEART RATE: 83 BPM | TEMPERATURE: 97.5 F | OXYGEN SATURATION: 99 % | DIASTOLIC BLOOD PRESSURE: 77 MMHG | SYSTOLIC BLOOD PRESSURE: 130 MMHG

## 2024-10-04 PROCEDURE — G0151 HHCP-SERV OF PT,EA 15 MIN: HCPCS

## 2024-10-08 ENCOUNTER — HOME CARE VISIT (OUTPATIENT)
Dept: HOME HEALTH SERVICES | Facility: HOME HEALTHCARE | Age: 44
End: 2024-10-08
Payer: COMMERCIAL

## 2024-10-08 VITALS
DIASTOLIC BLOOD PRESSURE: 93 MMHG | HEART RATE: 82 BPM | TEMPERATURE: 97.3 F | SYSTOLIC BLOOD PRESSURE: 135 MMHG | OXYGEN SATURATION: 98 %

## 2024-10-08 PROCEDURE — G0151 HHCP-SERV OF PT,EA 15 MIN: HCPCS

## 2024-10-08 NOTE — Clinical Note
Brain Ayers was discharged from home health physical therapy on 10/8/2024 with all goals met. He is independent with ambulation with and without an assistive device on level surfaces and steps, independent with transfers and independent with his HEP. He starts OP physical therapy on 10/14/2024.

## 2024-10-08 NOTE — Clinical Note
Patient was discharged from physical therapy and the agency on 10/8/2024 to outpatient PT, in good condition, with all goals met.

## 2024-10-08 NOTE — HOME HEALTH
"Subjective: \"I've been off pain medication since Friday.\"    Assessment: Patient has progressed well with physical therapy with all goals met. He is independent with ambulation with and without an assistive device on level surfaces and steps, independent with transfers and independent with his HEP.    Communication: Case communication to Dr. Buck; case communication to Tamra Leon RN clinical manager and Joy Bennett, schedule"

## 2024-10-10 ENCOUNTER — OFFICE VISIT (OUTPATIENT)
Dept: ORTHOPEDIC SURGERY | Facility: CLINIC | Age: 44
End: 2024-10-10
Payer: COMMERCIAL

## 2024-10-10 VITALS — HEIGHT: 70 IN | TEMPERATURE: 100 F | BODY MASS INDEX: 25.62 KG/M2 | WEIGHT: 179 LBS

## 2024-10-10 DIAGNOSIS — Z96.641 S/P TOTAL RIGHT HIP ARTHROPLASTY: Primary | ICD-10-CM

## 2024-10-10 PROCEDURE — 99024 POSTOP FOLLOW-UP VISIT: CPT | Performed by: ORTHOPAEDIC SURGERY

## 2024-10-10 NOTE — PROGRESS NOTES
Brain Ayers : 1980 MRN: [id number] DATE: 10/10/2024    DIAGNOSIS: 2 week follow up right total hip     SUBJECTIVE:Patient returns today for 2 week follow up of right total hip replacement. Patient reports doing well with no unusual complaints. Appears to be progressing appropriately. Feels much improved. Doing stairs and walking without pain.     OBJECTIVE:   Exam:. The incision is healing appropriately. No sign of infection. Range of motion is progressing as expected. The calf is soft and nontender with a negative Homans sign.    DIAGNOSTIC STUDIES  Xrays: 2 views of the right hip (AP pelvis and lateral right hip) were ordered and reviewed for evaluation of recent hip replacement. They demonstrate a well positioned, well aligned hip replacement without complicating factors noted. In comparison with previous films there has been interval implant placement.    ASSESSMENT: 2 week status post right hip replacement.    PLAN: 1) Dressing removed and steri strips applied   2) PT exercises   3) Continue ice PRN   4) WBAT   5) aspirin 81 mg orally every day for 4 weeks   6) Follow up in 4 weeks with repeat Xrays of right hip (2views)    2-week total hip information packet given and discussed including dental and GI procedure antibiotic prophylaxis.     Valentin Buck MD  10/10/2024

## 2024-10-14 ENCOUNTER — TREATMENT (OUTPATIENT)
Dept: PHYSICAL THERAPY | Facility: CLINIC | Age: 44
End: 2024-10-14
Payer: COMMERCIAL

## 2024-10-14 DIAGNOSIS — Z96.641 STATUS POST TOTAL REPLACEMENT OF RIGHT HIP: ICD-10-CM

## 2024-10-14 DIAGNOSIS — M25.651 DECREASED RANGE OF RIGHT HIP MOVEMENT: ICD-10-CM

## 2024-10-14 DIAGNOSIS — M87.051 AVASCULAR NECROSIS OF BONE OF RIGHT HIP: Primary | ICD-10-CM

## 2024-10-14 PROCEDURE — 97110 THERAPEUTIC EXERCISES: CPT

## 2024-10-14 PROCEDURE — 97161 PT EVAL LOW COMPLEX 20 MIN: CPT

## 2024-10-14 PROCEDURE — 97530 THERAPEUTIC ACTIVITIES: CPT

## 2024-10-14 NOTE — PROGRESS NOTES
Physical Therapy Initial Evaluation and Plan of Care                                               [address], [address]                                                           [place], [place]                                                         [phone]    Patient: Brain Ayers   : 1980  Diagnosis/ICD-10 Code:  Avascular necrosis of bone of right hip [M87.051]  Referring practitioner: Valentin Buck MD  Date of Initial Visit: 10/14/2024  Today's Date: 10/14/2024  Patient seen for 1 sessions           Subjective Questionnaire: LEFS: 54      Subjective Evaluation    History of Present Illness  Date of surgery: 2024  Mechanism of injury: Pt is s/p R LISA on 24. He had been experiencing right hip pain for many months without known injury or trauma. He underwent OP PT with no improvement of symptoms. After obtaining a MRI, he was diagnosed with avascular necrosis. He decided to proceed with a LISA.    Following his procedure he reports significant improvement in his R hip pain. He has not used a cane or walker in over 5-6 days and no longer requires pain medication for his pain. He has been walking, sitting, and navigating stairs without difficulty. His greatest complaint is persisting muscular tightness and stiffness felt mostly along his anterior and lateral thigh. He feels that he cannot take as big of a step on the right compared to the left due to tightness.    Pt reports good compliance with post-op restrictions including sleeping with a pillow between his knees.     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 Quality of life: good    Pain  Current pain ratin  At worst pain ratin  Location: R upper and lateral thigh  Quality: throbbing, dull ache and tight  Relieving factors: ice and medications  Aggravating factors: sleeping and squatting  Progression: improved    Social Support  Lives in: multiple-level  home  Lives with: young children    Diagnostic Tests  X-ray: normal    Treatments  Previous treatment: home therapy  Current treatment: medication  Patient Goals  Patient goals for therapy: decreased pain, return to sport/leisure activities and increased strength  Patient goal: golfing, basketball         Objective          Tenderness     Right Hip   Tenderness in the greater trochanter.     Active Range of Motion   Left Hip   Flexion: 130 degrees   Abduction: 50 degrees   External rotation (90/90): 45 degrees   Internal rotation (90/90): 35 degrees     Right Hip   Flexion: 100 degrees with pain  Abduction: 42 degrees   External rotation (90/90): 30 degrees   Internal rotation (90/90): 20 degrees   Left Knee   Normal active range of motion    Right Knee   Normal active range of motion    Strength/Myotome Testing     Left Hip   Planes of Motion   Flexion: 5  Abduction: 4+  Adduction: 4+    Right Hip   Planes of Motion   Flexion: 4+  Abduction: 4  Adduction: 4+    Left Knee   Flexion: 5  Extension: 5    Right Knee   Flexion: 5  Extension: 5    Ambulation     Observational Gait   Walking speed within functional limits. Decreased right stance time and right step length.           Assessment & Plan       Assessment  Impairments: abnormal gait, abnormal or restricted ROM, impaired balance, impaired physical strength and lacks appropriate home exercise program   Functional limitations: sleeping and walking   Assessment details: Brain is a 44 y/o male reporting to OP PT for initial evaluation s/p R LISA performed by Dr. Buck on 9/25/24. He was diagnosed with R hip avascular necrosis earlier this year following a longer history of R hip pain. Following his procedure, he reports a significant improvement in R hip pain. He had 2 weeks of home health PT which went well. He no longer requires the use of an AD for ambulation and is navigating stairs reciprocally without pain. Brain denies difficulty with any ADL but reports  persisting stiffness and tightness in his right anterior and lateral thigh. He feels that his gait mechanics are not symmetrical due to decreased step length through his R LE. Upon evaluation, he presents with expected deficits in R hip AROM and strength. Skilled OP PT is deemed reasonable and necessary in order to address these deficits, limitations, and impairments and assist with improving independence and safety with all ADLs and recreational activities.   Prognosis: good    Goals  Plan Goals: Short Term: to be met in 4 weeks  1. Pt will be independent and compliant with his HEP  2. Pt will report 0-2/10 pain in his R hip  3. Pt will ambulate on even surfaces with symmetrical gait mechanics     Plan  Therapy options: will be seen for skilled therapy services  Planned therapy interventions: functional ROM exercises, gait training, home exercise program, therapeutic activities, stretching and strengthening  Frequency: 2x month  Duration in weeks: 4  Treatment plan discussed with: patient        Manual Therapy:    0     mins  40041;  Therapeutic Exercise:    10     mins  57768;     Neuromuscular Aj:    0    mins  87556;    Therapeutic Activity:     10     mins  54846;     Gait Trainin     mins  66755;     Ultrasound:     0     mins  10674;    Electrical Stimulation:    0     mins  72316 ( );  Dry Needling     0     mins self-pay    Timed Treatment:   20   mins   Total Treatment:     35   mins    PT SIGNATURE: Sincere Limon PT, DPT  [place] License #: [id number]   Sincere Limon PT, 10/14/24, 11:37 AM EDT  DATE TREATMENT INITIATED: 10/14/2024    Initial Certification  Certification Period: 2025  I certify that the therapy services are furnished while this patient is under my care.  The services outlined above are required by this patient, and will be reviewed every 90 days.     PHYSICIAN: Valentin Buck MD      DATE:     Please sign and return via fax to [phone].. Thank you, [facility].

## 2024-10-21 ENCOUNTER — TELEPHONE (OUTPATIENT)
Dept: ORTHOPEDIC SURGERY | Facility: CLINIC | Age: 44
End: 2024-10-21
Payer: COMMERCIAL

## 2024-10-21 ENCOUNTER — TELEPHONE (OUTPATIENT)
Dept: PHYSICAL THERAPY | Facility: CLINIC | Age: 44
End: 2024-10-21

## 2024-10-21 NOTE — TELEPHONE ENCOUNTER
Called patient and asked if he had any redness, drainage, or heat coming from the incision. Patient stated no to all three questions. When asked about the fever and what his temperature was patient stated they did not take there temperature but just went off how he felt because he had some body aches and felt like he had a fever when he woke up this morning. Patient states that his body aches and fever have gone away. Surgery date 9-

## 2024-10-21 NOTE — TELEPHONE ENCOUNTER
Caller: Brain Ayers    Relationship: Self       What was the call regarding: [place]

## 2024-10-23 ENCOUNTER — TELEPHONE (OUTPATIENT)
Dept: ORTHOPEDIC SURGERY | Facility: HOSPITAL | Age: 44
End: 2024-10-23
Payer: COMMERCIAL

## 2024-10-23 NOTE — TELEPHONE ENCOUNTER
Attempted to reach Mr. Ayers to see how he has been doing since his RTH 9/25. Message left at this time.

## 2024-10-28 ENCOUNTER — TREATMENT (OUTPATIENT)
Dept: PHYSICAL THERAPY | Facility: CLINIC | Age: 44
End: 2024-10-28
Payer: COMMERCIAL

## 2024-11-11 ENCOUNTER — OFFICE VISIT (OUTPATIENT)
Dept: ORTHOPEDIC SURGERY | Facility: CLINIC | Age: 44
End: 2024-11-11
Payer: COMMERCIAL

## 2024-11-11 VITALS — HEIGHT: 70 IN | WEIGHT: 181.8 LBS | BODY MASS INDEX: 26.03 KG/M2 | TEMPERATURE: 98.6 F

## 2024-11-11 DIAGNOSIS — Z96.641 S/P TOTAL RIGHT HIP ARTHROPLASTY: Primary | ICD-10-CM

## 2024-11-11 PROCEDURE — 99024 POSTOP FOLLOW-UP VISIT: CPT | Performed by: ORTHOPAEDIC SURGERY

## 2024-11-11 PROCEDURE — 73502 X-RAY EXAM HIP UNI 2-3 VIEWS: CPT | Performed by: ORTHOPAEDIC SURGERY

## 2024-11-11 NOTE — PROGRESS NOTES
rBain Ayers : 1980 MRN: [id number] DATE: 2024    DIAGNOSIS: 6 week follow up right total hip     SUBJECTIVE:Patient returns today for 6 week follow up of right total hip replacement. Patient reports doing well with no unusual complaints. Appears to be progressing appropriately and is off a cane    OBJECTIVE:   Exam:. The incision is healed. No sign of infection. Range of motion is progressing as expected. The calf is soft and nontender with a negative Homans sign. Strength progressing    DIAGNOSTIC STUDIES  Xrays: 2 views of the right  hip (AP pelvis and lateral right hip) were ordered and reviewed for evaluation of recent hip replacement. They demonstrate a well positioned, well aligned hip replacement without complicating factors noted. In comparison with previous films there has been interval implant placement.    ASSESSMENT: 6 week status post right  hip replacement.    PLAN: 1) Activity as tolerated   2) Continue hip strengthening exercises    3) ok to stop DVT ppx    4) Follow up in 8 weeks with repeat Xrays of  hip (2views AP Pelvis and lateral  right hip)    Antibiotics before dental and GI procedures discussed.     Valentin Buck MD  2024

## 2025-01-13 ENCOUNTER — OFFICE VISIT (OUTPATIENT)
Dept: ORTHOPEDIC SURGERY | Facility: CLINIC | Age: 45
End: 2025-01-13
Payer: COMMERCIAL

## 2025-01-13 VITALS — TEMPERATURE: 98.6 F | BODY MASS INDEX: 27.62 KG/M2 | WEIGHT: 192.9 LBS | HEIGHT: 70 IN

## 2025-01-13 DIAGNOSIS — R52 PAIN: ICD-10-CM

## 2025-01-13 DIAGNOSIS — M25.552 LEFT HIP PAIN: Primary | ICD-10-CM

## 2025-01-13 DIAGNOSIS — Z96.641 S/P TOTAL RIGHT HIP ARTHROPLASTY: ICD-10-CM

## 2025-01-13 RX ORDER — HYDROXYZINE HYDROCHLORIDE 25 MG/1
25-50 TABLET, FILM COATED ORAL
COMMUNITY
Start: 2024-11-25

## 2025-01-13 NOTE — PROGRESS NOTES
Patient: Brain Ayers  YOB: 1980 44 y.o. male  Medical Record Number: [id number]    Chief Complaint:   Chief Complaint   Patient presents with    Right Hip - Post-op    Left Hip - Pain, Initial Evaluation       History of Present Illness:Brain Ayers is a 44 y.o. male who presents for follow-up of right total hip.  And left hip pain.  He states his right hip is doing quite well.  He has noticed increased pain in the left hip is starting to be similar to how the right hip felt.    Allergies: No Known Allergies    Medications:   Current Outpatient Medications   Medication Sig Dispense Refill    acetaminophen (TYLENOL) 500 MG tablet Take 1 tablet by mouth Every 6 (Six) Hours As Needed for Mild Pain.      amitriptyline (ELAVIL) 10 MG tablet Take 1-2 tablets by mouth Daily.      hydrOXYzine (ATARAX) 25 MG tablet Take 1-2 tablets by mouth.      losartan (COZAAR) 50 MG tablet Take 1 tablet by mouth Daily. HOLD PRIOR TO SURGERY 24 HOURS  Indications: High Blood Pressure      NON FORMULARY SLEEP 3 + SUPPLEMENT BY NATURES BOUNTY-HOLD PRIOR TO SURGERY      vitamin D3 125 MCG (5000 UT) capsule capsule Take 1 capsule by mouth Daily. HOLD PRIOR TO SURGERY      vitamin E 200 UNIT capsule Take 1 capsule by mouth Daily. HELD PRIOR TO SURGERY      busPIRone (BUSPAR) 10 MG tablet Take 1 tablet by mouth. Indications: Anxiety Disorder      venlafaxine XR (EFFEXOR-XR) 150 MG 24 hr capsule Take 1 capsule by mouth Daily. Indications: Major Depressive Disorder       No current facility-administered medications for this visit.         The following portions of the patient's history were reviewed and updated as appropriate: allergies, current medications, past family history, past medical history, past social history, past surgical history and problem list.    Review of Systems:   A 14-point review of systems was performed. All systems negative except pertinent positives/negative listed in HPI above    Physical Exam:  "  Vitals:    01/13/25 1033   Temp: 98.6 °F (37 °C)   TempSrc: Temporal   Weight: 87.5 kg (192 lb 14.4 oz)   Height: 177.8 cm (70\")   PainSc:   4   PainLoc: Hip  Comment: left and right       General: A and O x 3, ASA, NAD    SCLERAE:    Normal    DENTITION:   Normal  Right examined gentle range of motion well-tolerated no other significant findings from previous.    Left hip examined gentle range of motion decreased due to some discomfort.  No overt tenderness palpation.  Motor and sensory intact distally.  Slight antalgic gait with ambulation.    Radiology:   AP pelvis as well as AP of the left hip and bilateral hip lateral films taken reviewed imaging shows status post right total hip replacement implant satisfactory position no significant change from previous.  Evidence of some slight degenerative changes of the left hip, slight abnormality of the femoral head consistent with likely passed AVN versus bone sclerosis no overt evidence of femoral head collapse.    Assessment/Plan:  Left hip pain, possible AVN, status post right total hip    Patient is doing well with his right hip.  Will plan to see him in 1 year  he of that however the left hip has become more of an issue symptoms are similar to the right hip which had avascular necrosis.  It has been over a year since his most recent MRI we will order a new MRI for further evaluation as this can help dictate management.  I briefly discussed that depending on evidence surgical intervention consisting of a total hip replacement could be warranted.  He would like to proceed with MRI before further surgical discussion however he is aware that surgical intervention could be warranted depending on results.  Patient will follow-up after MRI for review.  If any change in interim he will let us know.  All questions answered.      Valentin Buck MD  1/13/2025   "

## 2025-02-03 ENCOUNTER — HOSPITAL ENCOUNTER (OUTPATIENT)
Dept: MRI IMAGING | Facility: HOSPITAL | Age: 45
Discharge: HOME | End: 2025-02-03
Admitting: ORTHOPAEDIC SURGERY
Payer: COMMERCIAL

## 2025-02-03 ENCOUNTER — TELEPHONE (OUTPATIENT)
Dept: ORTHOPEDIC SURGERY | Facility: CLINIC | Age: 45
End: 2025-02-03

## 2025-02-03 DIAGNOSIS — M25.552 LEFT HIP PAIN: ICD-10-CM

## 2025-02-03 PROCEDURE — 73721 MRI JNT OF LWR EXTRE W/O DYE: CPT

## 2025-02-03 NOTE — TELEPHONE ENCOUNTER
Caller: Brain Ayers    Relationship: Self    Best call back number: [phone]    What was the call regarding: POSSIBLE MRI RESULTS

## 2025-02-04 ENCOUNTER — PATIENT MESSAGE (OUTPATIENT)
Dept: ORTHOPEDIC SURGERY | Facility: CLINIC | Age: 45
End: 2025-02-04

## 2025-02-04 ENCOUNTER — OFFICE VISIT (OUTPATIENT)
Dept: ORTHOPEDIC SURGERY | Facility: CLINIC | Age: 45
End: 2025-02-04
Payer: COMMERCIAL

## 2025-02-04 VITALS — HEIGHT: 70 IN | TEMPERATURE: 98.3 F | BODY MASS INDEX: 27.8 KG/M2 | WEIGHT: 194.2 LBS

## 2025-02-04 DIAGNOSIS — M87.052 AVASCULAR NECROSIS OF BONE OF LEFT HIP: Primary | ICD-10-CM

## 2025-02-04 PROCEDURE — 99214 OFFICE O/P EST MOD 30 MIN: CPT | Performed by: ORTHOPAEDIC SURGERY

## 2025-02-04 RX ORDER — ACETAMINOPHEN 10 MG/ML
1000 INJECTION, SOLUTION INTRAVENOUS ONCE
OUTPATIENT
Start: 2025-02-04 | End: 2025-02-04

## 2025-02-04 RX ORDER — PREGABALIN 75 MG/1
150 CAPSULE ORAL ONCE
OUTPATIENT
Start: 2025-02-04 | End: 2025-02-04

## 2025-02-04 RX ORDER — MELOXICAM 7.5 MG/1
15 TABLET ORAL ONCE
OUTPATIENT
Start: 2025-02-04 | End: 2025-02-04

## 2025-02-04 RX ORDER — CEPHALEXIN 500 MG/1
CAPSULE ORAL
Qty: 4 CAPSULE | Refills: 0 | Status: SHIPPED | OUTPATIENT
Start: 2025-02-04

## 2025-02-04 NOTE — PROGRESS NOTES
Patient: Brain Ayers  YOB: 1980 44 y.o. male  Medical Record Number: [id number]    Chief Complaint:   Chief Complaint   Patient presents with    Left Hip - Follow-up    Right Hip - Follow-up       History of Present Illness:Brain Ayers is a 44 y.o. male who presents for follow-up of left hip pain.  Patient does continue to have left hip pain did have previous AVN on the right requiring hip replacement surgery the left did show some mild changes on previous MRI thus given increasing symptoms knee MRI was ordered.  He is here to discuss results.  He states his left hip continues to be problematic limiting normal daily activities overall quality life feels symptoms are progressively getting worse.    Allergies: No Known Allergies    Medications:   Current Outpatient Medications   Medication Sig Dispense Refill    acetaminophen (TYLENOL) 500 MG tablet Take 1 tablet by mouth Every 6 (Six) Hours As Needed for Mild Pain.      amitriptyline (ELAVIL) 10 MG tablet Take 1-2 tablets by mouth Daily.      hydrOXYzine (ATARAX) 25 MG tablet Take 1-2 tablets by mouth.      losartan (COZAAR) 50 MG tablet Take 1 tablet by mouth Daily. HOLD PRIOR TO SURGERY 24 HOURS  Indications: High Blood Pressure      NON FORMULARY SLEEP 3 + SUPPLEMENT BY NATURES DLIdenIve-HOLD PRIOR TO SURGERY      vitamin D3 125 MCG (5000 UT) capsule capsule Take 1 capsule by mouth Daily. HOLD PRIOR TO SURGERY      vitamin E 200 UNIT capsule Take 1 capsule by mouth Daily. HELD PRIOR TO SURGERY      busPIRone (BUSPAR) 10 MG tablet Take 1 tablet by mouth. Indications: Anxiety Disorder      cephalexin (Keflex) 500 MG capsule Take 4 tablets take 1 hour prior to dental cleaning or procedures 4 capsule 0    venlafaxine XR (EFFEXOR-XR) 150 MG 24 hr capsule Take 1 capsule by mouth Daily. Indications: Major Depressive Disorder       No current facility-administered medications for this visit.         The following portions of the patient's history were  "reviewed and updated as appropriate: allergies, current medications, past family history, past medical history, past social history, past surgical history and problem list.    Review of Systems:   A 14-point review of systems was performed. All systems negative except pertinent positives/negative listed in HPI above    Physical Exam:   Vitals:    02/04/25 0815   Temp: 98.3 °F (36.8 °C)   TempSrc: Temporal   Weight: 88.1 kg (194 lb 3.2 oz)   Height: 177.8 cm (70\")   PainSc:   5   PainLoc: Hip       General: A and O x 3, ASA, NAD    SCLERAE:    Normal    DENTITION:   Normal  Left hip examined gentle range of motion decreased due to some discomfort.  Motor and sensory intact distally.    Radiology:   MRI reviewed and discussed with the patient    Narrative & Impression   MRI HIP LEFT WO CONTRAST-     2/3/2025 6:44 AM     INDICATION: Left hip pain for several months. Possible avascular  necrosis.     COMPARISON: Radiographs 11/13/2025. MRI 1/29/2024.     TECHNIQUE: Multiplanar, multisequence MR imaging of the pelvis and hip  was performed without the intravenous administration of contrast.     FINDINGS:  SOFT TISSUES: No significant soft tissue edema. No deep or superficial  soft tissue mass. No abnormal bursal fluid collection. The sciatic  nerves are unremarkable as imaged. Mildly enlarged left external  iliac/pelvic sidewall lymph node, measuring 1 cm in short axis (axial  series 5 image 23), possibly reactive.     BONES: Serpiginous signal abnormality in the left femoral head involving  nearly 1% of the subchondral bone, consistent with osteoporosis, with  associated comminuted subchondral fracture and mild cortical  irregularity of the anterior left femoral head. Patchy bone marrow edema  like signal throughout the left femoral neck. Serpiginous 2 cm signal  abnormality in the intertrochanteric proximal left femur with central  fat signal, also consistent with osteonecrosis. Nondisplaced stress type  fracture of " the posterior/superior left acetabulum with associated  patchy bone marrow edema-like signal. No concerning bone marrow lesion  or marrow replacing process.     JOINTS: Right LISA with associated hardware artifact. Moderate to large  left hip joint effusion. Mild to moderate diffuse chondromalacia at the  left hip with full-thickness or near full-thickness chondral loss at the  anterior left acetabulum and femoral head. Likely degenerative increased  signal in the ligamentum teres at the fovea. The pubic symphysis is  unremarkable. The SI joints are unremarkable. Partially imaged  lumbosacral spondylosis.     LABRUM: Likely degenerative partial-thickness undersurface tear at the  base of the anterior/superior left acetabular labrum. No paralabral  cyst.     MUSCLES AND TENDONS: The anterior muscles and tendons are intact. The  gluteal tendons are intact. No significant muscular fatty atrophy or  myositis. The proximal hamstring tendons are intact.     IMPRESSION:     1. Osteonecrosis of the left femoral head involving nearly the entirety  of the subchondral bone with associated comminuted subchondral fracture  of the anterior left femoral head, mild associated cortical  irregularity, and bone marrow edema like signal extending into the  femoral neck.  2. Focal osteonecrosis in the intertrochanteric proximal left femur.  3. Nondisplaced chest type fracture of the posterior/superior left  acetabulum with associated patchy bone marrow edema-like signal.  4. Mild to moderate left hip joint chondromalacia with full-thickness or  near full-thickness chondral loss at the anterior left femoral head and  acetabulum and a large joint effusion.  5. Right LISA with associated hardware artifact.  6. Mildly prominent left external iliac/pelvic sidewall lymph node,  possibly reactive.        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  Given the changes about his hip I feel that surgical intervention consisting of total hip replacement is warranted.  His symptoms are affecting his normal to activity overall quality life.  We discussed surgery in detail including risk benefits.  I did discuss risk and benefits with the patient with risk including but not limited to bleeding, infection, damage to nearby nerves, vessels, tendons, ligaments, continued pain, worsening pain, fracture, dislocation, leg length discrepancy, blood clots, even death with anesthesia and possible need for future procedures surgeries.  Patient understood this and has chosen to proceed.    Proceed with left total hip replacement surgery (like to be scheduled end of March or early April)    Patient will need to be nicotine free 4 weeks prior to surgery and 2 weeks after    Patient needs avoid strenuous activity recommended use a cane or crutch in the contralateral hand continue vitamin D Tylenol for pain try to avoid anti-inflammatories.      Plan for outpatient same-day surgery        Note right hip sizes 56 shell, 5 stem, +3.5 head ball      Valentin Buck MD  2/4/2025

## 2025-02-17 ENCOUNTER — TELEPHONE (OUTPATIENT)
Dept: ORTHOPEDIC SURGERY | Facility: CLINIC | Age: 45
End: 2025-02-17
Payer: COMMERCIAL

## 2025-02-17 NOTE — TELEPHONE ENCOUNTER
Call returned to the patient.  I was unable to reach him but I did leave a message for him to contact me at the office regarding surgery scheduling

## 2025-04-14 ENCOUNTER — PRE-ADMISSION TESTING (OUTPATIENT)
Dept: PREADMISSION TESTING | Facility: HOSPITAL | Age: 45
End: 2025-04-14
Payer: COMMERCIAL

## 2025-04-14 VITALS
HEIGHT: 70 IN | WEIGHT: 198.7 LBS | OXYGEN SATURATION: 96 % | RESPIRATION RATE: 18 BRPM | SYSTOLIC BLOOD PRESSURE: 137 MMHG | DIASTOLIC BLOOD PRESSURE: 75 MMHG | TEMPERATURE: 98.4 F | BODY MASS INDEX: 28.45 KG/M2 | HEART RATE: 71 BPM

## 2025-04-14 LAB
ABO GROUP BLD: NORMAL
ANION GAP SERPL CALCULATED.3IONS-SCNC: 15.5 MMOL/L (ref 5–15)
BLD GP AB SCN SERPL QL: NEGATIVE
BUN SERPL-MCNC: 11 MG/DL (ref 6–20)
BUN/CREAT SERPL: 12.2 (ref 7–25)
CALCIUM SPEC-SCNC: 9.4 MG/DL (ref 8.6–10.5)
CHLORIDE SERPL-SCNC: 99 MMOL/L (ref 98–107)
CO2 SERPL-SCNC: 27.5 MMOL/L (ref 22–29)
CREAT SERPL-MCNC: 0.9 MG/DL (ref 0.76–1.27)
DEPRECATED RDW RBC AUTO: 40.7 FL (ref 37–54)
EGFRCR SERPLBLD CKD-EPI 2021: 108 ML/MIN/1.73
ERYTHROCYTE [DISTWIDTH] IN BLOOD BY AUTOMATED COUNT: 12.5 % (ref 12.3–15.4)
GLUCOSE SERPL-MCNC: 115 MG/DL (ref 65–99)
HBA1C MFR BLD: 5.3 % (ref 4.8–5.6)
HCT VFR BLD AUTO: 38.8 % (ref 37.5–51)
HGB BLD-MCNC: 12.7 G/DL (ref 13–17.7)
MCH RBC QN AUTO: 29.3 PG (ref 26.6–33)
MCHC RBC AUTO-ENTMCNC: 32.7 G/DL (ref 31.5–35.7)
MCV RBC AUTO: 89.4 FL (ref 79–97)
PLATELET # BLD AUTO: 314 10*3/MM3 (ref 140–450)
PMV BLD AUTO: 9.1 FL (ref 6–12)
POTASSIUM SERPL-SCNC: 3.9 MMOL/L (ref 3.5–5.2)
QT INTERVAL: 387 MS
QTC INTERVAL: 412 MS
RBC # BLD AUTO: 4.34 10*6/MM3 (ref 4.14–5.8)
RH BLD: POSITIVE
SODIUM SERPL-SCNC: 142 MMOL/L (ref 136–145)
T&S EXPIRATION DATE: NORMAL
WBC NRBC COR # BLD AUTO: 9.77 10*3/MM3 (ref 3.4–10.8)

## 2025-04-14 PROCEDURE — G0480 DRUG TEST DEF 1-7 CLASSES: HCPCS | Performed by: ORTHOPAEDIC SURGERY

## 2025-04-14 PROCEDURE — 36415 COLL VENOUS BLD VENIPUNCTURE: CPT

## 2025-04-14 PROCEDURE — 86900 BLOOD TYPING SEROLOGIC ABO: CPT | Performed by: ORTHOPAEDIC SURGERY

## 2025-04-14 PROCEDURE — 86850 RBC ANTIBODY SCREEN: CPT | Performed by: ORTHOPAEDIC SURGERY

## 2025-04-14 PROCEDURE — 93010 ELECTROCARDIOGRAM REPORT: CPT | Performed by: INTERNAL MEDICINE

## 2025-04-14 PROCEDURE — 93005 ELECTROCARDIOGRAM TRACING: CPT

## 2025-04-14 PROCEDURE — 80048 BASIC METABOLIC PNL TOTAL CA: CPT

## 2025-04-14 PROCEDURE — 85027 COMPLETE CBC AUTOMATED: CPT

## 2025-04-14 PROCEDURE — 83036 HEMOGLOBIN GLYCOSYLATED A1C: CPT | Performed by: ORTHOPAEDIC SURGERY

## 2025-04-14 PROCEDURE — 86901 BLOOD TYPING SEROLOGIC RH(D): CPT | Performed by: ORTHOPAEDIC SURGERY

## 2025-04-14 NOTE — DISCHARGE INSTRUCTIONS
Take the following medications the morning of surgery:  VENLAFAXINE    THE HOSPITAL WILL CALL YOU 1-2 DAYS PRIOR TO SURGERY WITH YOUR ARRIVAL TIME.    If you are on prescription narcotic pain medication to control your pain you may also take that medication the morning of surgery.      General Instructions:     Do not eat solid food after midnight the night before surgery.  Clear liquids day of surgery are allowed but must be stopped at least two hours before your hospital arrival time.       Allowed clear liquids      Water, sodas, and tea or coffee with no cream or milk added.       12 to 20 ounces of a clear liquid that contains carbohydrates is recommended.  If non-diabetic, have Gatorade or Powerade.  If diabetic, have G2 or Powerade Zero.     Do not have liquids red in color.  Do not consume chicken, beef, pork or vegetable broth or bouillon cubes of any variety as they are not considered clear liquids and are not allowed.      Infants may have breast milk up to four hours before surgery.  Infants drinking formula may drink formula up to six hours before surgery.   Patients who avoid smoking, chewing tobacco and alcohol for 4 weeks prior to surgery have a reduced risk of post-operative complications.  Quit smoking as many days before surgery as you can.  Do not smoke, use chewing tobacco or drink alcohol the day of surgery.   If applicable bring your C-PAP/ BI-PAP machine in with you to preop day of surgery.  Bring any papers given to you in the doctor’s office.  Wear clean comfortable clothes.  Do not wear contact lenses, false eyelashes or make-up.  Bring a case for your glasses.   Bring crutches or walker if applicable.  Remove all piercings.  Leave jewelry and any other valuables at home.  Hair extensions with metal clips must be removed prior to surgery.  The Pre-Admission Testing nurse will instruct you to bring medications if unable to obtain an accurate list in Pre-Admission Testing.    Day of surgery  you will need to let the preoperative nurse know the last time you took each of your medications.  To ensure a safe environment for patients and staff, we kindly ask that children under the age of 16 not accompany patients.  If you must bring a dependent child or dependent adult please ensure a responsible adult, other than yourself, is present to supervise them.      If you were given a blood bank ID arm band remember to bring it with you the day of surgery.    Preventing a Surgical Site Infection:  For 2 to 3 days before surgery, avoid shaving with a razor because the razor can irritate skin and make it easier to develop an infection.    Any areas of open skin can increase the risk of a post-operative wound infection by allowing bacteria to enter and travel throughout the body.  Notify your surgeon if you have any skin wounds / rashes even if it is not near the expected surgical site.  The area will need assessed to determine if surgery should be delayed until it is healed.  The night prior to surgery shower using a fresh bar of anti-bacterial soap (such as Dial) and clean washcloth.  Sleep in a clean bed with clean clothing.  Do not allow pets to sleep with you.  Shower on the morning of surgery using a fresh bar of anti-bacterial soap (such as Dial) and clean washcloth.  Dry with a clean towel and dress in clean clothing.  Ask your surgeon if you will be receiving antibiotics prior to surgery.  Make sure you, your family, and all healthcare providers clean their hands with soap and water or an alcohol based hand  before caring for you or your wound.    Day of surgery:  Your arrival time is approximately two hours before your scheduled surgery time.  Please note if you have an early arrival time the surgery doors do not open before 5:00 AM.  Upon arrival, a Pre-op nurse and Anesthesiologist will review your health history, obtain vital signs, and answer questions you may have.  The only belongings needed  at this time will be a list of your home medications and if applicable your C-PAP/BI-PAP machine.  A Pre-op nurse will start an IV and you may receive medication in preparation for surgery, including something to help you relax.     Please be aware that surgery does come with discomfort.  We want to make every effort to control your discomfort so please discuss any uncontrolled symptoms with your nurse.   Your doctor will most likely have prescribed pain medications.      If you are going home after surgery you will receive individualized written care instructions before being discharged.  A responsible adult must drive you to and from the hospital on the day of your surgery and ideally stay with you through the night.   .  Discharge prescriptions can be filled by the hospital pharmacy during regular pharmacy hours.  If you are having surgery late in the day/evening your prescription may be e-prescribed to your pharmacy.  Please verify your pharmacy hours or chose a 24 hour pharmacy to avoid not having access to your prescription because your pharmacy has closed for the day.    If you are staying overnight following surgery, you will be transported to your hospital room following the recovery period.  [facility] has all private rooms.    If you have any questions please call Pre-Admission Testing at [phone].  Deductibles and co-payments are collected on the day of service. Please be prepared to pay the required co-pay, deductible or deposit on the day of service as defined by your plan.    Call your surgeon immediately if you experience any of the following symptoms:  Sore Throat  Shortness of Breath or difficulty breathing  Cough  Chills  Body soreness or muscle pain  Headache  Fever  New loss of taste or smell  Do not arrive for your surgery ill.  Your procedure will need to be rescheduled to another time.  You will need to call your physician before the day of surgery to avoid any unnecessary  exposure to hospital staff as well as other patients.    CHLORHEXIDINE CLOTH INSTRUCTIONS  The morning of surgery follow these instructions using the Chlorhexidine cloths you've been given.  These steps reduce bacteria on the body.  Do not use the cloths near your eyes, ears mouth, genitalia or on open wounds.  Throw the cloths away after use but do not try to flush them down a toilet.      Open and remove one cloth at a time from the package.    Leave the cloth unfolded and begin the bathing.  Massage the skin with the cloths using gentle pressure to remove bacteria.  Do not scrub harshly.   Follow the steps below with one 2% CHG cloth per area (6 total cloths).  One cloth for neck, shoulders and chest.  One cloth for both arms, hands, fingers and underarms (do underarms last).  One cloth for the abdomen followed by groin.  One cloth for right leg and foot including between the toes.  One cloth for left leg and foot including between the toes.  The last cloth is to be used for the back of the neck, back and buttocks.    Allow the CHG to air dry 3 minutes on the skin which will give it time to work and decrease the chance of irritation.  The skin may feel sticky until it is dry.  Do not rinse with water or any other liquid or you will lose the beneficial effects of the CHG.  If mild skin irritation occurs, do rinse the skin to remove the CHG.  Report this to the nurse at time of admission.  Do not apply lotions, creams, ointments, deodorants or perfumes after using the clothes. Dress in clean clothes before coming to the hospital.

## 2025-04-17 ENCOUNTER — OFFICE VISIT (OUTPATIENT)
Dept: ORTHOPEDIC SURGERY | Facility: CLINIC | Age: 45
End: 2025-04-17
Payer: COMMERCIAL

## 2025-04-17 VITALS — TEMPERATURE: 97.5 F | WEIGHT: 196.5 LBS | HEIGHT: 70 IN | BODY MASS INDEX: 28.13 KG/M2

## 2025-04-17 DIAGNOSIS — M87.052 AVASCULAR NECROSIS OF BONE OF LEFT HIP: Primary | ICD-10-CM

## 2025-04-17 PROCEDURE — 99213 OFFICE O/P EST LOW 20 MIN: CPT | Performed by: ORTHOPAEDIC SURGERY

## 2025-04-17 NOTE — H&P
Patient: Brain Ayers        YOB: 1980    Medical Record Number: [id number]    Admitting Physician: Dr. Valentin Buck    Reason for Admission: Left hip avascular necrosis    History of Present Illness: 44 y.o. male presents with left hip avascular necrosis which has not been responsive to the full complement of conservative measures.  Patient states symptoms have progressed and become limiting with normal daily activities and his quality of life.  Despite conservative attempts, there is still severe, constant activity limiting hip pain. Given the severity of the pain, the patient has elected to proceed with hip replacement.    Allergies: No Known Allergies      Current Medications:  Home Medications:    No current facility-administered medications on file prior to encounter.     Current Outpatient Medications on File Prior to Encounter   Medication Sig    acetaminophen (TYLENOL) 500 MG tablet Take 1 tablet by mouth Every 6 (Six) Hours As Needed for Mild Pain.    amitriptyline (ELAVIL) 10 MG tablet Take 1-2 tablets by mouth At Night As Needed.    busPIRone (BUSPAR) 10 MG tablet Take 1 tablet by mouth Daily As Needed. Indications: Anxiety Disorder    cephalexin (Keflex) 500 MG capsule Take 4 tablets take 1 hour prior to dental cleaning or procedures    hydrOXYzine (ATARAX) 25 MG tablet Take 0.5 tablets by mouth Daily.    losartan (COZAAR) 50 MG tablet Take 1 tablet by mouth Daily. HOLD PRIOR TO SURGERY 24 HOURS  Indications: High Blood Pressure    NON FORMULARY Take 1 tablet by mouth Every Night. SLEEP 3 + SUPPLEMENT BY NATURES BOUNTY-HOLD PRIOR TO SURGERY    venlafaxine XR (EFFEXOR-XR) 150 MG 24 hr capsule Take 1 capsule by mouth Daily. Indications: Major Depressive Disorder    vitamin D3 125 MCG (5000 UT) capsule capsule Take 1 capsule by mouth Daily. PT HOLDING FOR SURGERY    vitamin E 200 UNIT capsule Take 1 capsule by mouth Daily. HELD PRIOR TO SURGERY     PRN Meds:.    PMH:  Past Medical  History:   Diagnosis Date    Anxiety     Avascular necrosis of bone of hip, left     Avascular necrosis of bone of right hip     Depression     Hypertension     Left hip pain         PSURGH:  Past Surgical History:   Procedure Laterality Date    TOTAL HIP ARTHROPLASTY Right 09/25/2024    Procedure: Right anterior total hip arthroplasty;  Surgeon: Valentin Buck MD;  Location: [facility] OR [facility];  Service: Orthopedics;  Laterality: Right;       SocialHx:  Social History     Occupational History    Not on file   Tobacco Use    Smoking status: Never     Passive exposure: Never    Smokeless tobacco: Former     Types: Snuff    Tobacco comments:     Nicotine pouch   Vaping Use    Vaping status: Never Used   Substance and Sexual Activity    Alcohol use: Not Currently     Comment: 4/14/25-  OF SOBRIETY    Drug use: Never    Sexual activity: Defer      Social History     Social History Narrative    Not on file       FamHx:  Family History   Problem Relation Age of Onset    Hypertension Mother     Arthritis Mother     No Known Problems Father     Malig Hyperthermia Neg Hx          Review of Systems:   A 14 point review of systems was performed, pertinent positives discussed above, all other systems are negative    Physical Exam: 44 y.o. male  Vital Signs :   There were no vitals filed for this visit.    General: Alert and Oriented x 3, No acute distress.  Psych: mood and affect appropriate; recent and remote memory intact  Eyes: conjunctivae clear; pupils equally round and reactive, Sclerae anicteric  CV: no peripheral edema  Resp: normal respiratory effort  Skin: no rashes or wounds; normal turgor  Musculosketetal; pain with hip range of motion. Positive stinchfeld test. No trochanteric  Tenderness.  Vascular: palpable distal pulses    Hip exam unchanged    Labs:    Pre-Admission Testing on 04/14/2025   Component Date Value Ref Range Status    Glucose 04/14/2025 115 (H)  65 - 99 mg/dL Final    BUN 04/14/2025 11  6 - 20  mg/dL Final    Creatinine 04/14/2025 0.90  0.76 - 1.27 mg/dL Final    Sodium 04/14/2025 142  136 - 145 mmol/L Final    Potassium 04/14/2025 3.9  3.5 - 5.2 mmol/L Final    Chloride 04/14/2025 99  98 - 107 mmol/L Final    CO2 04/14/2025 27.5  22.0 - 29.0 mmol/L Final    Calcium 04/14/2025 9.4  8.6 - 10.5 mg/dL Final    BUN/Creatinine Ratio 04/14/2025 12.2  7.0 - 25.0 Final    Anion Gap 04/14/2025 15.5 (H)  5.0 - 15.0 mmol/L Final    eGFR 04/14/2025 108.0  >60.0 mL/min/1.73 Final    WBC 04/14/2025 9.77  3.40 - 10.80 10*3/mm3 Final    RBC 04/14/2025 4.34  4.14 - 5.80 10*6/mm3 Final    Hemoglobin 04/14/2025 12.7 (L)  13.0 - 17.7 g/dL Final    Hematocrit 04/14/2025 38.8  37.5 - 51.0 % Final    MCV 04/14/2025 89.4  79.0 - 97.0 fL Final    MCH 04/14/2025 29.3  26.6 - 33.0 pg Final    MCHC 04/14/2025 32.7  31.5 - 35.7 g/dL Final    RDW 04/14/2025 12.5  12.3 - 15.4 % Final    RDW-SD 04/14/2025 40.7  37.0 - 54.0 fl Final    MPV 04/14/2025 9.1  6.0 - 12.0 fL Final    Platelets 04/14/2025 314  140 - 450 10*3/mm3 Final    QT Interval 04/14/2025 387  ms Final    QTC Interval 04/14/2025 412  ms Final     No results found.          Xrays:  Xrays AP pelvis and a lateral of the Left hip were reviewed demonstrating left hip avascular necrosis.  MRI also confirms    Assessment: Left hip avascular osteonecrosis conservative measures have failed.      Plan:  The plan is to proceed with Left Total Hip Replacement. The patient voiced understanding of the risks, benefits, and alternative forms of treatment that were discussed with Dr Buck at the time of scheduling.  Patient's symptoms have progressed.  His symptoms are limiting his normal day activities or quality of life at this time I feel he would benefit with proceeding as noted above.  Labs and EKG personally reviewed and discussed with the patient.  He states he will do the joints class.    Plan outpatient surgery.      Of note implants on the contralateral side 56 cup, size 5  stem, +3.5 head ball    Valentin Buck MD  4/17/2025     This note was copied and pasted from most recent office visit.  Interval addendum may be made to update this documentation as needed.    Valentin Buck MD

## 2025-04-17 NOTE — PROGRESS NOTES
Patient: Brain Ayers        YOB: 1980    Medical Record Number: [id number]    Admitting Physician: Dr. Valentin Buck    Reason for Admission: Left hip avascular necrosis    History of Present Illness: 44 y.o. male presents with left hip avascular necrosis which has not been responsive to the full complement of conservative measures.  Patient states symptoms have progressed and become limiting with normal daily activities and his quality of life.  Despite conservative attempts, there is still severe, constant activity limiting hip pain. Given the severity of the pain, the patient has elected to proceed with hip replacement.    Allergies: No Known Allergies      Current Medications:  Home Medications:    Current Outpatient Medications on File Prior to Visit   Medication Sig    acetaminophen (TYLENOL) 500 MG tablet Take 1 tablet by mouth Every 6 (Six) Hours As Needed for Mild Pain.    amitriptyline (ELAVIL) 10 MG tablet Take 1-2 tablets by mouth At Night As Needed.    cephalexin (Keflex) 500 MG capsule Take 4 tablets take 1 hour prior to dental cleaning or procedures    hydrOXYzine (ATARAX) 25 MG tablet Take 0.5 tablets by mouth Daily.    losartan (COZAAR) 50 MG tablet Take 1 tablet by mouth Daily. HOLD PRIOR TO SURGERY 24 HOURS  Indications: High Blood Pressure    NON FORMULARY Take 1 tablet by mouth Every Night. SLEEP 3 + SUPPLEMENT BY NATURES BOUNTY-HOLD PRIOR TO SURGERY    vitamin D3 125 MCG (5000 UT) capsule capsule Take 1 capsule by mouth Daily. PT HOLDING FOR SURGERY    vitamin E 200 UNIT capsule Take 1 capsule by mouth Daily. HELD PRIOR TO SURGERY    busPIRone (BUSPAR) 10 MG tablet Take 1 tablet by mouth Daily As Needed. Indications: Anxiety Disorder    venlafaxine XR (EFFEXOR-XR) 150 MG 24 hr capsule Take 1 capsule by mouth Daily. Indications: Major Depressive Disorder     No current facility-administered medications on file prior to visit.     PRN Meds:.    PMH:  Past Medical History:  "  Diagnosis Date    Anxiety     Avascular necrosis of bone of hip, left     Avascular necrosis of bone of right hip     Depression     Hypertension     Left hip pain         PSURGH:  Past Surgical History:   Procedure Laterality Date    TOTAL HIP ARTHROPLASTY Right 09/25/2024    Procedure: Right anterior total hip arthroplasty;  Surgeon: Valentin Buck MD;  [facility] [facility] [place];  Service: Orthopedics;  Laterality: Right;       SocialHx:  Social History     Occupational History    Not on file   Tobacco Use    Smoking status: Never     Passive exposure: Never    Smokeless tobacco: Former     Types: Snuff    Tobacco comments:     Nicotine pouch   Vaping Use    Vaping status: Never Used   Substance and Sexual Activity    Alcohol use: Not Currently     Comment: 4/14/25-  OF SOBRIETY    Drug use: Never    Sexual activity: Defer      Social History     Social History Narrative    Not on file       FamHx:  Family History   Problem Relation Age of Onset    Hypertension Mother     Arthritis Mother     No Known Problems Father     Malig Hyperthermia Neg Hx          Review of Systems:   A 14 point review of systems was performed, pertinent positives discussed above, all other systems are negative    Physical Exam: 44 y.o. male  Vital Signs :   Vitals:    04/17/25 0920   Temp: 97.5 °F (36.4 °C)   TempSrc: Temporal   Weight: 89.1 kg (196 lb 8 oz)   Height: 177.8 cm (70\")   PainSc: 6    PainLoc: Hip     General: Alert and Oriented x 3, No acute distress.  Psych: mood and affect appropriate; recent and remote memory intact  Eyes: conjunctivae clear; pupils equally round and reactive, Sclerae anicteric  CV: no peripheral edema  Resp: normal respiratory effort  Skin: no rashes or wounds; normal turgor  Musculosketetal; pain with hip range of motion. Positive stinchfeld test. No trochanteric  Tenderness.  Vascular: palpable distal pulses    Hip exam unchanged    Labs:    Pre-Admission Testing on 04/14/2025   Component " Date Value Ref Range Status    Glucose 04/14/2025 115 (H)  65 - 99 mg/dL Final    BUN 04/14/2025 11  6 - 20 mg/dL Final    Creatinine 04/14/2025 0.90  0.76 - 1.27 mg/dL Final    Sodium 04/14/2025 142  136 - 145 mmol/L Final    Potassium 04/14/2025 3.9  3.5 - 5.2 mmol/L Final    Chloride 04/14/2025 99  98 - 107 mmol/L Final    CO2 04/14/2025 27.5  22.0 - 29.0 mmol/L Final    Calcium 04/14/2025 9.4  8.6 - 10.5 mg/dL Final    BUN/Creatinine Ratio 04/14/2025 12.2  7.0 - 25.0 Final    Anion Gap 04/14/2025 15.5 (H)  5.0 - 15.0 mmol/L Final    eGFR 04/14/2025 108.0  >60.0 mL/min/1.73 Final    WBC 04/14/2025 9.77  3.40 - 10.80 10*3/mm3 Final    RBC 04/14/2025 4.34  4.14 - 5.80 10*6/mm3 Final    Hemoglobin 04/14/2025 12.7 (L)  13.0 - 17.7 g/dL Final    Hematocrit 04/14/2025 38.8  37.5 - 51.0 % Final    MCV 04/14/2025 89.4  79.0 - 97.0 fL Final    MCH 04/14/2025 29.3  26.6 - 33.0 pg Final    MCHC 04/14/2025 32.7  31.5 - 35.7 g/dL Final    RDW 04/14/2025 12.5  12.3 - 15.4 % Final    RDW-SD 04/14/2025 40.7  37.0 - 54.0 fl Final    MPV 04/14/2025 9.1  6.0 - 12.0 fL Final    Platelets 04/14/2025 314  140 - 450 10*3/mm3 Final    QT Interval 04/14/2025 387  ms Final    QTC Interval 04/14/2025 412  ms Final     No results found.          Xrays:  Xrays AP pelvis and a lateral of the Left hip were reviewed demonstrating left hip avascular necrosis.  MRI also confirms    Assessment: Left hip avascular osteonecrosis conservative measures have failed.      Plan:  The plan is to proceed with Left Total Hip Replacement. The patient voiced understanding of the risks, benefits, and alternative forms of treatment that were discussed with Dr Buck at the time of scheduling.  Patient's symptoms have progressed.  His symptoms are limiting his normal day activities or quality of life at this time I feel he would benefit with proceeding as noted above.  Labs and EKG personally reviewed and discussed with the patient.  He states he will do the  joints class.    Plan outpatient surgery.      Of note implants on the contralateral side 56 cup, size 5 stem, +3.5 head ball    Valentin Buck MD  4/17/2025

## 2025-04-18 ENCOUNTER — TELEPHONE (OUTPATIENT)
Dept: ORTHOPEDIC SURGERY | Facility: HOSPITAL | Age: 45
End: 2025-04-18
Payer: COMMERCIAL

## 2025-04-18 NOTE — TELEPHONE ENCOUNTER
Attempted to reach Mr. Ayers as he is listed as going home after his upcoming procedure 4/25. VM left X1. When attempted again was unable to leave message and call was disconnected. Mr. Ayers was able to successfully go home after his last procedure 9/24. Information taken from previous encounter. Unable to verify information at this time. Awaiting call back.     Summary: Pre Op Call  Risk Factor yes no   Age >75   X   BMI <20 >40   X   Patient History       Chronic Pain (2 or more meds/Pain Management)   X   ETOH (more than 3 drinks Daily)   X   Uncontrolled Depression/Bipolar/Schizoaffective Disorder   X   Arrhythmias--   X   Stent placement/MI   X   DVT/PE   X   Pacemaker   X   HTN (uncontrolled or requiring more than 2 medications)   X   CHF/Retained fluids/Edema   X   Stroke with Residual    X   COPD/Asthma   X   MORGAN--Non-compliant with CPAP   X   Diabetes (on insulin or more than 2 meds)         A1C:5.3   X   BPH/Urinary retention (on medication)   X   CKD   X   Home environment and support       Current ambulation status (use of cane, walker, W/C, Multiple falls/weakness)   X   Stairs to enter and throughout home X     Lives Alone   X   Doesn't have support at home   X   Outpatient Screening Assessment     Home needs: (Walker/BSC):  Has walker  ? [address] (second floor apartment)  Caregiver 24-48hrs post-discharge: Mati     Discharge Plan:   [facility] had [facility]    Prescriptions: Meds to bed     Home medications:    [] Blood thinner/anti-coag therapy--   [] BPH or diuretic--  ? BP meds-- Losartan   [] Pain/Anti-inflammatories--  Pre-op Education:  Educate patient on spinal anesthesia/pain control:  ? patient verbalize understanding     Educate patient on hospital course/timeline:  ?  patient verbalize understanding     Joint Care Class:  ?  yes [] no  Notes:

## 2025-04-20 LAB
COTININE SERPL-MCNC: 18.1 NG/ML
NICOTINE SERPL-MCNC: <1 NG/ML

## 2025-04-22 ENCOUNTER — TELEPHONE (OUTPATIENT)
Dept: ORTHOPEDIC SURGERY | Facility: HOSPITAL | Age: 45
End: 2025-04-22
Payer: COMMERCIAL

## 2025-04-22 NOTE — TELEPHONE ENCOUNTER
Attempted to reach Mr. Ayers to verify information regarding his upcoming procedure 4/25. Message left at this time.

## 2025-04-24 NOTE — DISCHARGE PLACEMENT REQUEST
"Brain Celments (44 y.o. Male)       Date of Birth   1980    Social Security Number       Address   [address] [place] [address]    Home Phone   [phone]    MRN   [id number]       Synagogue   Religious    Marital Status                               Admission Date       Admission Type   Elective    Admitting Provider   Valentin Buck MD    Attending Provider   Valentin Buck MD    Department, Room/Bed   [facility], --/--       Discharge Date       Discharge Disposition       Discharge Destination                                 Attending Provider: Valentin Buck MD    Allergies: No Known Allergies    Isolation: None   Infection: None   Code Status: CPR    Ht: 177.8 cm (70\")   Wt: 89.1 kg (196 lb 8 oz)    Admission Cmt: None   Principal Problem: Avascular necrosis of bone of left hip [M87.052]                   Active Insurance as of 4/25/2025       Primary Coverage       Payor Plan Insurance Group Employer/Plan Group    ANTHEM BLUE CROSS AN[employer]M [employer] CROSS BLUE SHIELD PPO [id number]       Payor Plan Address Payor Plan Phone Number Payor Plan Fax Number Effective Dates    [address] [phone]  3/11/2024 - None Entered    [facility] [id number]         Subscriber Name Subscriber Birth Date Member ID       BRAIN CLEMENTS 1980 [id number]                     Emergency Contacts        (Rel.) Home Phone Work Phone Mobile Phone    YOSVANY HUI (Significant Other) -- -- [phone]              "

## 2025-04-25 ENCOUNTER — HOSPITAL ENCOUNTER (OUTPATIENT)
Facility: HOSPITAL | Age: 45
Setting detail: HOSPITAL OUTPATIENT SURGERY
Discharge: HOME | End: 2025-04-25
Attending: ORTHOPAEDIC SURGERY | Admitting: ORTHOPAEDIC SURGERY
Payer: COMMERCIAL

## 2025-04-25 ENCOUNTER — APPOINTMENT (OUTPATIENT)
Dept: GENERAL RADIOLOGY | Facility: HOSPITAL | Age: 45
End: 2025-04-25
Payer: COMMERCIAL

## 2025-04-25 ENCOUNTER — ANESTHESIA EVENT (OUTPATIENT)
Dept: PERIOP | Facility: HOSPITAL | Age: 45
End: 2025-04-25
Payer: COMMERCIAL

## 2025-04-25 ENCOUNTER — ANESTHESIA (OUTPATIENT)
Dept: PERIOP | Facility: HOSPITAL | Age: 45
End: 2025-04-25
Payer: COMMERCIAL

## 2025-04-25 VITALS
SYSTOLIC BLOOD PRESSURE: 141 MMHG | RESPIRATION RATE: 14 BRPM | OXYGEN SATURATION: 98 % | HEART RATE: 90 BPM | DIASTOLIC BLOOD PRESSURE: 81 MMHG | TEMPERATURE: 98.4 F

## 2025-04-25 DIAGNOSIS — M87.052 AVASCULAR NECROSIS OF BONE OF LEFT HIP: Primary | ICD-10-CM

## 2025-04-25 PROCEDURE — C1776 JOINT DEVICE (IMPLANTABLE): HCPCS | Performed by: ORTHOPAEDIC SURGERY

## 2025-04-25 PROCEDURE — 25810000003 LACTATED RINGERS PER 1000 ML: Performed by: ANESTHESIOLOGY

## 2025-04-25 PROCEDURE — 25010000002 ONDANSETRON PER 1 MG: Performed by: NURSE ANESTHETIST, CERTIFIED REGISTERED

## 2025-04-25 PROCEDURE — 27130 TOTAL HIP ARTHROPLASTY: CPT | Performed by: ORTHOPAEDIC SURGERY

## 2025-04-25 PROCEDURE — 25010000002 ROPIVACAINE PER 1 MG: Performed by: ORTHOPAEDIC SURGERY

## 2025-04-25 PROCEDURE — 25010000002 HYDROMORPHONE 1 MG/ML SOLUTION: Performed by: NURSE ANESTHETIST, CERTIFIED REGISTERED

## 2025-04-25 PROCEDURE — 25010000002 EPINEPHRINE 1 MG/ML SOLUTION 30 ML VIAL: Performed by: ORTHOPAEDIC SURGERY

## 2025-04-25 PROCEDURE — 25010000002 HYDROMORPHONE PER 4 MG: Performed by: NURSE ANESTHETIST, CERTIFIED REGISTERED

## 2025-04-25 PROCEDURE — 97161 PT EVAL LOW COMPLEX 20 MIN: CPT

## 2025-04-25 PROCEDURE — 73501 X-RAY EXAM HIP UNI 1 VIEW: CPT

## 2025-04-25 PROCEDURE — 25810000003 LACTATED RINGERS SOLUTION: Performed by: ORTHOPAEDIC SURGERY

## 2025-04-25 PROCEDURE — 25010000002 KETOROLAC TROMETHAMINE PER 15 MG: Performed by: ORTHOPAEDIC SURGERY

## 2025-04-25 PROCEDURE — 76000 FLUOROSCOPY <1 HR PHYS/QHP: CPT

## 2025-04-25 PROCEDURE — 27130 TOTAL HIP ARTHROPLASTY: CPT | Performed by: SPECIALIST/TECHNOLOGIST, OTHER

## 2025-04-25 PROCEDURE — 25010000002 MORPHINE PER 10 MG: Performed by: ORTHOPAEDIC SURGERY

## 2025-04-25 PROCEDURE — 25010000002 FENTANYL CITRATE (PF) 50 MCG/ML SOLUTION: Performed by: NURSE ANESTHETIST, CERTIFIED REGISTERED

## 2025-04-25 PROCEDURE — 25010000002 FAMOTIDINE 10 MG/ML SOLUTION: Performed by: ANESTHESIOLOGY

## 2025-04-25 PROCEDURE — 25010000002 CEFAZOLIN PER 500 MG: Performed by: ORTHOPAEDIC SURGERY

## 2025-04-25 PROCEDURE — 97116 GAIT TRAINING THERAPY: CPT

## 2025-04-25 PROCEDURE — 25010000002 VANCOMYCIN HCL 1.25 G RECONSTITUTED SOLUTION 1 EACH VIAL: Performed by: ORTHOPAEDIC SURGERY

## 2025-04-25 PROCEDURE — 25010000002 ACETAMINOPHEN 10 MG/ML SOLUTION: Performed by: NURSE ANESTHETIST, CERTIFIED REGISTERED

## 2025-04-25 PROCEDURE — 25010000002 SUGAMMADEX 200 MG/2ML SOLUTION: Performed by: NURSE ANESTHETIST, CERTIFIED REGISTERED

## 2025-04-25 PROCEDURE — 25010000002 PROPOFOL 10 MG/ML EMULSION: Performed by: NURSE ANESTHETIST, CERTIFIED REGISTERED

## 2025-04-25 PROCEDURE — 25810000003 SODIUM CHLORIDE 0.9 % SOLUTION 250 ML FLEX CONT: Performed by: ORTHOPAEDIC SURGERY

## 2025-04-25 DEVICE — DEV WND/CLS CONTRL TISS STRATAFIX SPIRAL PDS PLS CT1 0 30CM: Type: IMPLANTABLE DEVICE | Site: HIP | Status: FUNCTIONAL

## 2025-04-25 DEVICE — CP HIP UPCHRG OSSEOTI LTD HL CUPS: Type: IMPLANTABLE DEVICE | Status: FUNCTIONAL

## 2025-04-25 DEVICE — IMPLANTABLE DEVICE
Type: IMPLANTABLE DEVICE | Site: HIP | Status: FUNCTIONAL
Brand: G7® VIVACIT-E®

## 2025-04-25 DEVICE — DEV CONTRL TISS STRATAFIX SPIRAL MNCRYL UD 3/0 PLS 30CM: Type: IMPLANTABLE DEVICE | Site: HIP | Status: FUNCTIONAL

## 2025-04-25 DEVICE — BIOLOX® DELTA, CERAMIC FEMORAL HEAD, S, Ø 36/-3.5, TAPER 12/14
Type: IMPLANTABLE DEVICE | Site: HIP | Status: FUNCTIONAL
Brand: BIOLOX® DELTA

## 2025-04-25 DEVICE — STEM FEM/HIP AVENIRCOMPLETE COLAR HI/OFFST HA SZ5: Type: IMPLANTABLE DEVICE | Site: HIP | Status: FUNCTIONAL

## 2025-04-25 DEVICE — IMPLANTABLE DEVICE
Type: IMPLANTABLE DEVICE | Site: HIP | Status: FUNCTIONAL
Brand: G7® ACETABULAR SYSTEM

## 2025-04-25 DEVICE — TOTAL HIP PRIMARY: Type: IMPLANTABLE DEVICE | Status: FUNCTIONAL

## 2025-04-25 RX ORDER — ASPIRIN 81 MG/1
TABLET ORAL
Qty: 60 TABLET | Refills: 0 | Status: SHIPPED | OUTPATIENT
Start: 2025-04-25 | End: 2025-06-10

## 2025-04-25 RX ORDER — POLYETHYLENE GLYCOL 3350 17 G/17G
17 POWDER, FOR SOLUTION ORAL DAILY
Qty: 238 G | Refills: 0 | Status: SHIPPED | OUTPATIENT
Start: 2025-04-25 | End: 2025-05-09

## 2025-04-25 RX ORDER — HYDRALAZINE HYDROCHLORIDE 20 MG/ML
5 INJECTION INTRAMUSCULAR; INTRAVENOUS
Status: DISCONTINUED | OUTPATIENT
Start: 2025-04-25 | End: 2025-04-25 | Stop reason: HOSPADM

## 2025-04-25 RX ORDER — PREGABALIN 75 MG/1
150 CAPSULE ORAL ONCE
Status: COMPLETED | OUTPATIENT
Start: 2025-04-25 | End: 2025-04-25

## 2025-04-25 RX ORDER — FLUMAZENIL 0.1 MG/ML
0.2 INJECTION INTRAVENOUS AS NEEDED
Status: DISCONTINUED | OUTPATIENT
Start: 2025-04-25 | End: 2025-04-25 | Stop reason: HOSPADM

## 2025-04-25 RX ORDER — MIDAZOLAM HYDROCHLORIDE 1 MG/ML
1 INJECTION, SOLUTION INTRAMUSCULAR; INTRAVENOUS
Status: DISCONTINUED | OUTPATIENT
Start: 2025-04-25 | End: 2025-04-25 | Stop reason: HOSPADM

## 2025-04-25 RX ORDER — TRANEXAMIC ACID 100 MG/ML
INJECTION, SOLUTION INTRAVENOUS AS NEEDED
Status: DISCONTINUED | OUTPATIENT
Start: 2025-04-25 | End: 2025-04-25 | Stop reason: SURG

## 2025-04-25 RX ORDER — ASPIRIN 81 MG/1
81 TABLET ORAL EVERY 12 HOURS SCHEDULED
Status: CANCELLED | OUTPATIENT
Start: 2025-04-26

## 2025-04-25 RX ORDER — MELOXICAM 7.5 MG/1
15 TABLET ORAL ONCE
Status: DISCONTINUED | OUTPATIENT
Start: 2025-04-25 | End: 2025-04-25

## 2025-04-25 RX ORDER — ROCURONIUM BROMIDE 10 MG/ML
INJECTION, SOLUTION INTRAVENOUS AS NEEDED
Status: DISCONTINUED | OUTPATIENT
Start: 2025-04-25 | End: 2025-04-25 | Stop reason: SURG

## 2025-04-25 RX ORDER — LABETALOL HYDROCHLORIDE 5 MG/ML
5 INJECTION, SOLUTION INTRAVENOUS
Status: DISCONTINUED | OUTPATIENT
Start: 2025-04-25 | End: 2025-04-25 | Stop reason: HOSPADM

## 2025-04-25 RX ORDER — ACETAMINOPHEN 10 MG/ML
INJECTION, SOLUTION INTRAVENOUS AS NEEDED
Status: DISCONTINUED | OUTPATIENT
Start: 2025-04-25 | End: 2025-04-25 | Stop reason: SURG

## 2025-04-25 RX ORDER — SODIUM CHLORIDE 0.9 % (FLUSH) 0.9 %
3 SYRINGE (ML) INJECTION EVERY 12 HOURS SCHEDULED
Status: DISCONTINUED | OUTPATIENT
Start: 2025-04-25 | End: 2025-04-25 | Stop reason: HOSPADM

## 2025-04-25 RX ORDER — SODIUM CHLORIDE, SODIUM LACTATE, POTASSIUM CHLORIDE, CALCIUM CHLORIDE 600; 310; 30; 20 MG/100ML; MG/100ML; MG/100ML; MG/100ML
9 INJECTION, SOLUTION INTRAVENOUS CONTINUOUS
Status: DISCONTINUED | OUTPATIENT
Start: 2025-04-25 | End: 2025-04-25 | Stop reason: HOSPADM

## 2025-04-25 RX ORDER — DOCUSATE SODIUM 100 MG/1
100 CAPSULE, LIQUID FILLED ORAL 2 TIMES DAILY
Qty: 60 CAPSULE | Refills: 0 | Status: SHIPPED | OUTPATIENT
Start: 2025-04-25

## 2025-04-25 RX ORDER — IPRATROPIUM BROMIDE AND ALBUTEROL SULFATE 2.5; .5 MG/3ML; MG/3ML
3 SOLUTION RESPIRATORY (INHALATION) ONCE AS NEEDED
Status: DISCONTINUED | OUTPATIENT
Start: 2025-04-25 | End: 2025-04-25 | Stop reason: HOSPADM

## 2025-04-25 RX ORDER — MELOXICAM 15 MG/1
15 TABLET ORAL DAILY
Qty: 10 TABLET | Refills: 0 | Status: SHIPPED | OUTPATIENT
Start: 2025-04-25 | End: 2025-05-05

## 2025-04-25 RX ORDER — OXYCODONE AND ACETAMINOPHEN 7.5; 325 MG/1; MG/1
1-2 TABLET ORAL EVERY 4 HOURS PRN
Qty: 28 TABLET | Refills: 0 | Status: SHIPPED | OUTPATIENT
Start: 2025-04-25 | End: 2025-04-28 | Stop reason: SDUPTHER

## 2025-04-25 RX ORDER — EPHEDRINE SULFATE 50 MG/ML
5 INJECTION, SOLUTION INTRAVENOUS ONCE AS NEEDED
Status: DISCONTINUED | OUTPATIENT
Start: 2025-04-25 | End: 2025-04-25 | Stop reason: HOSPADM

## 2025-04-25 RX ORDER — HYDROCODONE BITARTRATE AND ACETAMINOPHEN 7.5; 325 MG/1; MG/1
2 TABLET ORAL EVERY 4 HOURS PRN
Refills: 0 | Status: CANCELLED | OUTPATIENT
Start: 2025-04-25 | End: 2025-05-02

## 2025-04-25 RX ORDER — OXYCODONE AND ACETAMINOPHEN 7.5; 325 MG/1; MG/1
1 TABLET ORAL EVERY 4 HOURS PRN
Status: DISCONTINUED | OUTPATIENT
Start: 2025-04-25 | End: 2025-04-25

## 2025-04-25 RX ORDER — ATROPINE SULFATE 0.4 MG/ML
0.4 INJECTION, SOLUTION INTRAMUSCULAR; INTRAVENOUS; SUBCUTANEOUS ONCE AS NEEDED
Status: DISCONTINUED | OUTPATIENT
Start: 2025-04-25 | End: 2025-04-25 | Stop reason: HOSPADM

## 2025-04-25 RX ORDER — DIPHENHYDRAMINE HYDROCHLORIDE 50 MG/ML
12.5 INJECTION, SOLUTION INTRAMUSCULAR; INTRAVENOUS
Status: DISCONTINUED | OUTPATIENT
Start: 2025-04-25 | End: 2025-04-25 | Stop reason: HOSPADM

## 2025-04-25 RX ORDER — DROPERIDOL 2.5 MG/ML
0.62 INJECTION, SOLUTION INTRAMUSCULAR; INTRAVENOUS
Status: DISCONTINUED | OUTPATIENT
Start: 2025-04-25 | End: 2025-04-25 | Stop reason: HOSPADM

## 2025-04-25 RX ORDER — OXYCODONE AND ACETAMINOPHEN 7.5; 325 MG/1; MG/1
1 TABLET ORAL EVERY 4 HOURS PRN
Refills: 0 | Status: DISCONTINUED | OUTPATIENT
Start: 2025-04-25 | End: 2025-04-25 | Stop reason: HOSPADM

## 2025-04-25 RX ORDER — PROMETHAZINE HYDROCHLORIDE 25 MG/1
25 SUPPOSITORY RECTAL ONCE AS NEEDED
Status: DISCONTINUED | OUTPATIENT
Start: 2025-04-25 | End: 2025-04-25 | Stop reason: HOSPADM

## 2025-04-25 RX ORDER — ONDANSETRON 2 MG/ML
INJECTION INTRAMUSCULAR; INTRAVENOUS AS NEEDED
Status: DISCONTINUED | OUTPATIENT
Start: 2025-04-25 | End: 2025-04-25 | Stop reason: SURG

## 2025-04-25 RX ORDER — ONDANSETRON 4 MG/1
4 TABLET, FILM COATED ORAL EVERY 8 HOURS PRN
Qty: 8 TABLET | Refills: 0 | Status: SHIPPED | OUTPATIENT
Start: 2025-04-25

## 2025-04-25 RX ORDER — HYDROCODONE BITARTRATE AND ACETAMINOPHEN 5; 325 MG/1; MG/1
1 TABLET ORAL ONCE AS NEEDED
Status: DISCONTINUED | OUTPATIENT
Start: 2025-04-25 | End: 2025-04-25 | Stop reason: HOSPADM

## 2025-04-25 RX ORDER — OXYCODONE AND ACETAMINOPHEN 7.5; 325 MG/1; MG/1
2 TABLET ORAL EVERY 4 HOURS PRN
Refills: 0 | Status: DISCONTINUED | OUTPATIENT
Start: 2025-04-25 | End: 2025-04-25 | Stop reason: HOSPADM

## 2025-04-25 RX ORDER — NALOXONE HCL 0.4 MG/ML
0.2 VIAL (ML) INJECTION AS NEEDED
Status: DISCONTINUED | OUTPATIENT
Start: 2025-04-25 | End: 2025-04-25 | Stop reason: HOSPADM

## 2025-04-25 RX ORDER — FENTANYL CITRATE 50 UG/ML
50 INJECTION, SOLUTION INTRAMUSCULAR; INTRAVENOUS
Refills: 0 | Status: DISCONTINUED | OUTPATIENT
Start: 2025-04-25 | End: 2025-04-25 | Stop reason: HOSPADM

## 2025-04-25 RX ORDER — HYDROMORPHONE HYDROCHLORIDE 1 MG/ML
0.5 INJECTION, SOLUTION INTRAMUSCULAR; INTRAVENOUS; SUBCUTANEOUS
Status: DISCONTINUED | OUTPATIENT
Start: 2025-04-25 | End: 2025-04-25 | Stop reason: HOSPADM

## 2025-04-25 RX ORDER — HYDROCODONE BITARTRATE AND ACETAMINOPHEN 7.5; 325 MG/1; MG/1
1-2 TABLET ORAL EVERY 4 HOURS PRN
Qty: 28 TABLET | Refills: 0 | Status: SHIPPED | OUTPATIENT
Start: 2025-04-25 | End: 2025-04-25 | Stop reason: HOSPADM

## 2025-04-25 RX ORDER — ONDANSETRON 2 MG/ML
4 INJECTION INTRAMUSCULAR; INTRAVENOUS ONCE AS NEEDED
Status: DISCONTINUED | OUTPATIENT
Start: 2025-04-25 | End: 2025-04-25 | Stop reason: HOSPADM

## 2025-04-25 RX ORDER — KETAMINE HCL IN NACL, ISO-OSM 100MG/10ML
10 SYRINGE (ML) INJECTION ONCE
Status: DISCONTINUED | OUTPATIENT
Start: 2025-04-25 | End: 2025-04-25

## 2025-04-25 RX ORDER — FAMOTIDINE 10 MG/ML
20 INJECTION, SOLUTION INTRAVENOUS ONCE
Status: COMPLETED | OUTPATIENT
Start: 2025-04-25 | End: 2025-04-25

## 2025-04-25 RX ORDER — PROMETHAZINE HYDROCHLORIDE 25 MG/1
25 TABLET ORAL ONCE AS NEEDED
Status: DISCONTINUED | OUTPATIENT
Start: 2025-04-25 | End: 2025-04-25 | Stop reason: HOSPADM

## 2025-04-25 RX ORDER — KETAMINE HCL IN NACL, ISO-OSM 100MG/10ML
10 SYRINGE (ML) INJECTION
Status: DISCONTINUED | OUTPATIENT
Start: 2025-04-25 | End: 2025-04-25 | Stop reason: HOSPADM

## 2025-04-25 RX ORDER — DEXMEDETOMIDINE HYDROCHLORIDE 100 UG/ML
INJECTION, SOLUTION INTRAVENOUS AS NEEDED
Status: DISCONTINUED | OUTPATIENT
Start: 2025-04-25 | End: 2025-04-25 | Stop reason: SURG

## 2025-04-25 RX ORDER — MELOXICAM 15 MG/1
15 TABLET ORAL ONCE
Status: COMPLETED | OUTPATIENT
Start: 2025-04-25 | End: 2025-04-25

## 2025-04-25 RX ORDER — FENTANYL CITRATE 50 UG/ML
50 INJECTION, SOLUTION INTRAMUSCULAR; INTRAVENOUS
Status: DISCONTINUED | OUTPATIENT
Start: 2025-04-25 | End: 2025-04-25 | Stop reason: HOSPADM

## 2025-04-25 RX ORDER — PROPOFOL 10 MG/ML
VIAL (ML) INTRAVENOUS AS NEEDED
Status: DISCONTINUED | OUTPATIENT
Start: 2025-04-25 | End: 2025-04-25 | Stop reason: SURG

## 2025-04-25 RX ORDER — PANTOPRAZOLE SODIUM 40 MG/1
40 TABLET, DELAYED RELEASE ORAL DAILY
Qty: 10 TABLET | Refills: 0 | Status: SHIPPED | OUTPATIENT
Start: 2025-04-25 | End: 2025-05-05

## 2025-04-25 RX ORDER — MAGNESIUM HYDROXIDE 1200 MG/15ML
LIQUID ORAL AS NEEDED
Status: DISCONTINUED | OUTPATIENT
Start: 2025-04-25 | End: 2025-04-25 | Stop reason: HOSPADM

## 2025-04-25 RX ORDER — SODIUM CHLORIDE 0.9 % (FLUSH) 0.9 %
3-10 SYRINGE (ML) INJECTION AS NEEDED
Status: DISCONTINUED | OUTPATIENT
Start: 2025-04-25 | End: 2025-04-25 | Stop reason: HOSPADM

## 2025-04-25 RX ORDER — HYDROCODONE BITARTRATE AND ACETAMINOPHEN 7.5; 325 MG/1; MG/1
1 TABLET ORAL EVERY 4 HOURS PRN
Refills: 0 | Status: CANCELLED | OUTPATIENT
Start: 2025-04-25 | End: 2025-05-02

## 2025-04-25 RX ORDER — MELOXICAM 15 MG/1
15 TABLET ORAL DAILY
Status: CANCELLED | OUTPATIENT
Start: 2025-04-25

## 2025-04-25 RX ORDER — LIDOCAINE HYDROCHLORIDE 10 MG/ML
0.5 INJECTION, SOLUTION INFILTRATION; PERINEURAL ONCE AS NEEDED
Status: DISCONTINUED | OUTPATIENT
Start: 2025-04-25 | End: 2025-04-25 | Stop reason: HOSPADM

## 2025-04-25 RX ORDER — FENTANYL CITRATE 50 UG/ML
INJECTION, SOLUTION INTRAMUSCULAR; INTRAVENOUS AS NEEDED
Status: DISCONTINUED | OUTPATIENT
Start: 2025-04-25 | End: 2025-04-25 | Stop reason: SURG

## 2025-04-25 RX ADMIN — SODIUM CHLORIDE, POTASSIUM CHLORIDE, SODIUM LACTATE AND CALCIUM CHLORIDE: 600; 310; 30; 20 INJECTION, SOLUTION INTRAVENOUS at 08:28

## 2025-04-25 RX ADMIN — HYDROMORPHONE HYDROCHLORIDE 0.5 MG: 1 INJECTION, SOLUTION INTRAMUSCULAR; INTRAVENOUS; SUBCUTANEOUS at 12:44

## 2025-04-25 RX ADMIN — FAMOTIDINE 20 MG: 10 INJECTION, SOLUTION INTRAVENOUS at 07:35

## 2025-04-25 RX ADMIN — ONDANSETRON 4 MG: 2 INJECTION, SOLUTION INTRAMUSCULAR; INTRAVENOUS at 10:45

## 2025-04-25 RX ADMIN — TRANEXAMIC ACID 1000 MG: 100 INJECTION INTRAVENOUS at 08:52

## 2025-04-25 RX ADMIN — FENTANYL CITRATE 50 MCG: 50 INJECTION, SOLUTION INTRAMUSCULAR; INTRAVENOUS at 11:54

## 2025-04-25 RX ADMIN — ROCURONIUM BROMIDE 20 MG: 10 INJECTION INTRAVENOUS at 08:43

## 2025-04-25 RX ADMIN — HYDROMORPHONE HYDROCHLORIDE 0.5 MG: 1 INJECTION, SOLUTION INTRAMUSCULAR; INTRAVENOUS; SUBCUTANEOUS at 09:04

## 2025-04-25 RX ADMIN — SODIUM CHLORIDE, POTASSIUM CHLORIDE, SODIUM LACTATE AND CALCIUM CHLORIDE 9 ML/HR: 600; 310; 30; 20 INJECTION, SOLUTION INTRAVENOUS at 12:31

## 2025-04-25 RX ADMIN — SODIUM CHLORIDE, POTASSIUM CHLORIDE, SODIUM LACTATE AND CALCIUM CHLORIDE 500 ML: 600; 310; 30; 20 INJECTION, SOLUTION INTRAVENOUS at 07:25

## 2025-04-25 RX ADMIN — SUGAMMADEX 200 MG: 100 INJECTION, SOLUTION INTRAVENOUS at 10:52

## 2025-04-25 RX ADMIN — PREGABALIN 150 MG: 75 CAPSULE ORAL at 07:34

## 2025-04-25 RX ADMIN — FENTANYL CITRATE 50 MCG: 50 INJECTION, SOLUTION INTRAMUSCULAR; INTRAVENOUS at 08:39

## 2025-04-25 RX ADMIN — FENTANYL CITRATE 50 MCG: 50 INJECTION, SOLUTION INTRAMUSCULAR; INTRAVENOUS at 11:05

## 2025-04-25 RX ADMIN — Medication 10 MG: at 13:01

## 2025-04-25 RX ADMIN — PROPOFOL 200 MG: 10 INJECTION, EMULSION INTRAVENOUS at 08:39

## 2025-04-25 RX ADMIN — DEXMEDETOMIDINE HYDROCHLORIDE 7.5 MCG: 100 INJECTION, SOLUTION, CONCENTRATE INTRAVENOUS at 08:50

## 2025-04-25 RX ADMIN — ROCURONIUM BROMIDE 25 MG: 10 INJECTION INTRAVENOUS at 10:09

## 2025-04-25 RX ADMIN — TRANEXAMIC ACID 1000 MG: 100 INJECTION INTRAVENOUS at 10:31

## 2025-04-25 RX ADMIN — MELOXICAM 15 MG: 15 TABLET ORAL at 07:34

## 2025-04-25 RX ADMIN — VANCOMYCIN HYDROCHLORIDE 1250 MG: 1.25 INJECTION, POWDER, LYOPHILIZED, FOR SOLUTION INTRAVENOUS at 07:25

## 2025-04-25 RX ADMIN — DEXMEDETOMIDINE HYDROCHLORIDE 7.5 MCG: 100 INJECTION, SOLUTION, CONCENTRATE INTRAVENOUS at 08:40

## 2025-04-25 RX ADMIN — OXYCODONE AND ACETAMINOPHEN 1 TABLET: 7.5; 325 TABLET ORAL at 11:34

## 2025-04-25 RX ADMIN — SODIUM CHLORIDE, POTASSIUM CHLORIDE, SODIUM LACTATE AND CALCIUM CHLORIDE: 600; 310; 30; 20 INJECTION, SOLUTION INTRAVENOUS at 10:09

## 2025-04-25 RX ADMIN — HYDROMORPHONE HYDROCHLORIDE 0.5 MG: 1 INJECTION, SOLUTION INTRAMUSCULAR; INTRAVENOUS; SUBCUTANEOUS at 10:11

## 2025-04-25 RX ADMIN — FENTANYL CITRATE 50 MCG: 50 INJECTION, SOLUTION INTRAMUSCULAR; INTRAVENOUS at 09:11

## 2025-04-25 RX ADMIN — HYDROMORPHONE HYDROCHLORIDE 0.5 MG: 1 INJECTION, SOLUTION INTRAMUSCULAR; INTRAVENOUS; SUBCUTANEOUS at 12:20

## 2025-04-25 RX ADMIN — DEXMEDETOMIDINE HYDROCHLORIDE 5 MCG: 100 INJECTION, SOLUTION, CONCENTRATE INTRAVENOUS at 09:05

## 2025-04-25 RX ADMIN — HYDROMORPHONE HYDROCHLORIDE 0.5 MG: 1 INJECTION, SOLUTION INTRAMUSCULAR; INTRAVENOUS; SUBCUTANEOUS at 11:13

## 2025-04-25 RX ADMIN — HYDROMORPHONE HYDROCHLORIDE 0.5 MG: 1 INJECTION, SOLUTION INTRAMUSCULAR; INTRAVENOUS; SUBCUTANEOUS at 12:27

## 2025-04-25 RX ADMIN — CEFAZOLIN 2 G: 2 INJECTION, POWDER, FOR SOLUTION INTRAMUSCULAR; INTRAVENOUS at 08:30

## 2025-04-25 RX ADMIN — ACETAMINOPHEN 1000 MG: 1000 INJECTION INTRAVENOUS at 09:00

## 2025-04-25 RX ADMIN — ROCURONIUM BROMIDE 70 MG: 10 INJECTION INTRAVENOUS at 08:40

## 2025-04-25 RX ADMIN — FENTANYL CITRATE 50 MCG: 50 INJECTION, SOLUTION INTRAMUSCULAR; INTRAVENOUS at 11:39

## 2025-04-25 RX ADMIN — FENTANYL CITRATE 50 MCG: 50 INJECTION, SOLUTION INTRAMUSCULAR; INTRAVENOUS at 11:30

## 2025-04-25 NOTE — ANESTHESIA POSTPROCEDURE EVALUATION
Patient: Brain Ayers    Procedure Summary       Date: 04/25/25 Room / Location: [facility] OR [address] [place] [address] [facility]    Anesthesia Start: 0836 Anesthesia Stop: 1102    Procedure: Left Anterior TOTAL HIP ARTHROPLASTY (Left: Hip) Diagnosis:       Avascular necrosis of bone of left hip      (Avascular necrosis of bone of left hip [M87.052])    Surgeons: Valentin Buck MD Provider: Nu Zaldivar MD    Anesthesia Type: general ASA Status: 2            Anesthesia Type: general    Vitals  Vitals Value Taken Time   /81 04/25/25 13:15   Temp 36.9 °C (98.4 °F) 04/25/25 13:15   Pulse 82 04/25/25 13:20   Resp 14 04/25/25 13:15   SpO2 97 % 04/25/25 13:20   Vitals shown include unfiled device data.        Post Anesthesia Care and Evaluation    Patient location during evaluation: bedside  Patient participation: complete - patient participated  Level of consciousness: awake and alert  Pain management: adequate    Airway patency: patent  Anesthetic complications: No anesthetic complications  PONV Status: controlled  Cardiovascular status: acceptable  Respiratory status: acceptable  Hydration status: acceptable

## 2025-04-25 NOTE — ANESTHESIA PROCEDURE NOTES
Airway  Date/Time: 4/25/2025 8:45 AM    General Information and Staff    CRNA/CAA: RayDebbie, CRNA    Indications and Patient Condition  Indications for airway management: airway protection    Preoxygenated: yes  MILS maintained throughout    Mask difficulty assessment: 1 - vent by mask    Final Airway Details    Final airway type: endotracheal airway      Successful airway: ETT  Cuffed: yes   Successful intubation technique: direct laryngoscopy  Adjuncts used in placement: intubating stylet  Endotracheal tube insertion site: oral  Blade: Liz  Blade size: 4  ETT size (mm): 8.0  Cormack-Lehane Classification: grade I - full view of glottis  Placement verified by: chest auscultation and capnometry   Measured from: lips  ETT/EBT  to lips (cm): 22  Number of attempts at approach: 1  Assessment: lips, teeth, and gum same as pre-op and atraumatic intubation

## 2025-04-25 NOTE — PLAN OF CARE
Goal Outcome Evaluation:  Plan of Care Reviewed With: patient, significant other, family      Patient is a 44 y.o. male POD 0 L anterior LISA and is WBAT - seen today in [facility]. Patient is independent at baseline and lives with his significant other. Pt has 0 MARLA with no steps inside. Pt presents to PT with impaired strength, endurance, and pain limiting overall mobility. Today, patient required CGA for transfers and ambulated 120ft with rwx and CGA. Pt cued for step-through pattern with cues for upright posture and walker placement. Pt verbalized understanding of stair sequencing, though no stairs to navigate at home. Based on current clinical presentation, patient okay to DC home today with assist and HHPT to follow up. No further acute PT needs.

## 2025-04-25 NOTE — THERAPY EVALUATION
Patient Name: Brain Ayers  : 1980    MRN: [id number]                              Today's Date: 2025       Admit Date: 2025    Visit Dx:     ICD-10-CM ICD-9-CM   1. Avascular necrosis of bone of left hip  M87.052 733.42     Patient Active Problem List   Diagnosis    Avascular necrosis of bone of right hip    Avascular necrosis of bone of left hip     Past Medical History:   Diagnosis Date    Anxiety     Avascular necrosis of bone of hip, left     Avascular necrosis of bone of right hip     Depression     Hypertension     Left hip pain      Past Surgical History:   Procedure Laterality Date    TOTAL HIP ARTHROPLASTY Right 2024    Procedure: Right anterior total hip arthroplasty;  Surgeon: Valentin Buck MD;  [place] [facility] [place] [facility] [place]  Service: [place]opedics;  Laterality: Right;      General Information       Row Name 25 1449          Physical Therapy Time and Intention    Document Type evaluation  -     Mode of Treatment individual therapy;physical therapy  -       Row Name 25 1449          General Information    Patient Profile Reviewed yes  -     Prior Level of Function independent:;gait;transfer;bed mobility  -     Existing Precautions/Restrictions no known precautions/restrictions  -     Barriers to Rehab none identified  -       Row Name 25 1449          Living Environment    Current Living Arrangements apartment  -     People in Home significant other  -       Row Name 25 1449          Home Main Entrance    Number of Stairs, Main Entrance none  -       Row Name 25 1449          Stairs Within Home, Primary    Number of Stairs, Within Home, Primary none  -       Row Name 25 1449          Cognition    Orientation Status (Cognition) oriented x 4  -       Row Name 25 1449          Safety Issues/Impairments Affecting Functional Mobility    Impairments Affecting Function (Mobility) balance;endurance/activity  tolerance;strength;pain;range of motion (ROM)  -               User Key  (r) = Recorded By, (t) = Taken By, (c) = Cosigned By      Initials Name Provider Type     Aparna Condon PT Physical Therapist                   Mobility       Row Name 04/25/25 1450          Bed Mobility    Comment, (Bed Mobility) NT - UIC  -       Row Name 04/25/25 1450          Sit-Stand Transfer    Sit-Stand [place] (Transfers) verbal cues;contact guard  -     Assistive Device (Sit-Stand Transfers) walker, front-wheeled  -       Row Name 04/25/25 1450          Gait/Stairs (Locomotion)    [place] Level (Gait) verbal cues;contact guard  -     Assistive Device (Gait) walker, front-wheeled  -     Distance in Feet (Gait) 120  -     Deviations/Abnormal Patterns (Gait) antalgic;darling decreased;gait speed decreased;stride length decreased  -     Bilateral Gait Deviations forward flexed posture  -     Left Sided Gait Deviations weight shift ability decreased  -       Row Name 04/25/25 1450          Mobility    Extremity Weight-bearing Status left lower extremity  -     Left Lower Extremity (Weight-bearing Status) weight-bearing as tolerated (WBAT)  -               User Key  (r) = Recorded By, (t) = Taken By, (c) = Cosigned By      Initials Name Provider Type     Aparna Condon PT Physical Therapist                   Obj/Interventions       Row Name 04/25/25 1451          Range of Motion Comprehensive    General Range of Motion lower extremity range of motion deficits identified  -     Comment, General Range of Motion Expected post-op ROM deficits  -       Row Name 04/25/25 1451          Strength Comprehensive (MMT)    General Manual Muscle Testing (MMT) Assessment lower extremity strength deficits identified  -     Comment, General Manual Muscle Testing (MMT) Assessment Expected post-op strength deficits, RLE WFL, LLE grossly 3/5  -       Row Name 04/25/25 1451          Motor Skills     Therapeutic Exercise hip  -       Row Name 04/25/25 1451          Hip (Therapeutic Exercise)    Hip (Therapeutic Exercise) isometric exercises;strengthening exercise  -     Hip Isometrics (Therapeutic Exercise) flexion;extension;gluteal sets  -       Row Name 04/25/25 1451          Balance    Balance Interventions sitting;standing;sit to stand  -       Row Name 04/25/25 1451          Sensory Assessment (Somatosensory)    Sensory Assessment (Somatosensory) LE sensation intact  -               User Key  (r) = Recorded By, (t) = Taken By, (c) = Cosigned By      Initials Name Provider Type     Aparna Condon, PT Physical Therapist                   Goals/Plan    No documentation.                  Clinical Impression       [facility] Name 04/25/25 1452          Pain    Pretreatment Pain Rating 5/10  -     Posttreatment Pain Rating 5/10  -     Pain Location hip  -     Pain Side/Orientation left  -     Pain Management Interventions exercise or physical activity utilized  -     Response to Pain Interventions activity participation with tolerable pain  -       Row Name 04/25/25 1452          Plan of Care Review    Plan of Care Reviewed With patient;significant other;family  -     Outcome Evaluation Patient is a 44 y.o. male POD 0 L anterior LISA and is WBAT [place] [facility] [place] Patient is independent at baseline and lives with his significant other. Pt has 0 MARLA with no steps inside. Pt presents to PT with impaired strength, endurance, and pain limiting overall mobility. Today, patient required CGA for transfers and ambulated 120ft with rwx and CGA. Pt cued for step-through pattern with cues for upright posture and walker placement. Pt verbalized understanding of stair sequencing, though no stairs to navigate at home. Based on current clinical presentation, patient okay to DC home today with assist and HHPT to follow up. No further acute PT needs.  -       Row Name 04/25/25 1452          Therapy  Assessment/Plan (PT)    Therapy Frequency (PT) evaluation only  -       Row Name 04/25/25 1452          Vital Signs    O2 Delivery Pre Treatment room air  -     O2 Delivery Intra Treatment room air  -     O2 Delivery Post Treatment room air  -       Row Name 04/25/25 1452          Positioning and Restraints    Pre-Treatment Position sitting in chair/recliner  -     Post Treatment Position chair  -     In Chair notified nsg;reclined;call light within reach;encouraged to call for assist;with family/caregiver  -               User Key  (r) = Recorded By, (t) = Taken By, (c) = Cosigned By      Initials Name Provider Type     Aparna Condon, PT Physical Therapist                   Outcome Measures       Row Name 04/25/25 1501          How much help from another person do you currently need...    Turning from your back to your side while in flat bed without using bedrails? 4  -BH     Moving from lying on back to sitting on the side of a flat bed without bedrails? 4  -BH     Moving to and from a bed to a chair (including a wheelchair)? 3  -BH     Standing up from a chair using your arms (e.g., wheelchair, bedside chair)? 3  -BH     Climbing 3-5 steps with a railing? 3  -BH     To walk in hospital room? 3  -     AM-PAC 6 Clicks Score (PT) 20  -     Highest Level of Mobility Goal 6 --> Walk 10 steps or more  -       Row Name 04/25/25 1501          Functional Assessment    Outcome Measure Options AM-PAC 6 Clicks Basic Mobility (PT)  -               User Key  (r) = Recorded By, (t) = Taken By, (c) = Cosigned By      Initials Name Provider Type     Aparna Condon PT Physical Therapist                                 Physical Therapy Education       Title: PT OT SLP Therapies (Done)       Topic: Physical Therapy (Done)       Point: Mobility training (Done)       Learning Progress Summary            Patient Acceptance, E,TB,D, VU by  at 4/25/2025 1501                      Point: Home exercise  program (Done)       Learning Progress Summary            Patient Acceptance, E,TB,D, VU by  at 4/25/2025 1501                      Point: Body mechanics (Done)       Learning Progress Summary            Patient Acceptance, E,TB,D, VU by  at 4/25/2025 1501                      Point: Precautions (Done)       Learning Progress Summary            Patient Acceptance, E,TB,D, VU by  at 4/25/2025 1501                                      User Key       Initials Effective Dates Name Provider Type Discipline     04/08/22 -  Aparna Condon, PT Physical Therapist PT                  PT Recommendation and Plan     Outcome Evaluation: Patient is a 44 y.o. male POD 0 L anterior LISA and is WBAT - seen today in [facility]. Patient is independent at baseline and lives with his significant other. Pt has 0 MARLA with no steps inside. Pt presents to PT with impaired strength, endurance, and pain limiting overall mobility. Today, patient required CGA for transfers and ambulated 120ft with rwx and CGA. Pt cued for step-through pattern with cues for upright posture and walker placement. Pt verbalized understanding of stair sequencing, though no stairs to navigate at home. Based on current clinical presentation, patient okay to DC home today with assist and HHPT to follow up. No further acute PT needs.     Time Calculation:   PT Evaluation Complexity  History, PT Evaluation Complexity: 1-2 personal factors and/or comorbidities  Examination of Body Systems (PT Eval Complexity): total of 3 or more elements  Clinical Presentation (PT Evaluation Complexity): stable  Clinical Decision Making (PT Evaluation Complexity): low complexity  Overall Complexity (PT Evaluation Complexity): low complexity     PT Charges       Row Name 04/25/25 1501             Time Calculation    Start Time 1426  -      Stop Time 1439  -      Time Calculation (min) 13 min  -      PT Received On 04/25/25  -         Time Calculation- PT    Total Timed Code  Minutes- PT 10 minute(s)  -         Timed Charges    23406 - Gait Training Minutes  8  -BH      34837 - PT Therapeutic Activity Minutes 2  -BH         Total Minutes    Timed Charges Total Minutes 10  -BH       Total Minutes 10  -BH                User Key  (r) = Recorded By, (t) = Taken By, (c) = Cosigned By      Initials Name Provider Type     Aparna Condon, PT Physical Therapist                  Therapy Charges for Today       Code Description Service Date Service Provider Modifiers Qty    78186976817 HC GAIT TRAINING EA 15 MIN 4/25/2025 Aparna Condon, PT GP 1    94950717754 HC PT EVAL LOW COMPLEXITY 2 4/25/2025 Aparna Condon, PT GP 1            PT G-Codes  Outcome Measure Options: AM-PAC 6 Clicks Basic Mobility (PT)  AM-PAC 6 Clicks Score (PT): 20  PT Discharge Summary  Anticipated Discharge Disposition (PT): home with assist, home with home health    Aparna Condon, PT  4/25/2025

## 2025-04-25 NOTE — PROGRESS NOTES
Start PACC Note    Home Health Referral    Evaluated patient and spouse on Home Care and services available. Patient offered choice of available HHC and agreeable to PT services with [facility].    Care Types:   Isolation Precautions: No active isolations    Social Determinants of Health:  Tobacco Use: Medium Risk (4/25/2025)    Patient History     Smoking Tobacco Use: Never     Smokeless Tobacco Use: Former     Passive Exposure: Never     Social History     Substance and Sexual Activity   Alcohol Use Not Currently    Comment: 4/14/25-  OF SOBRIETY     Social History     Substance and Sexual Activity   Drug Use Never       Does the patient have any financial resource strain?   Does the patient have any food insecurities?   Does the patient have any housing instabilities?     If any of the above is noted as yes - consider a MSW evaluation once the patient returns home.    START PATIENT REGISTRATION INFORMATION  Order Information  Order Signing Physician: Valentin Buck MD  Service Ordered RN?: No  Service Ordered PT?: Yes  Service Ordered OT?: No  Service Ordered ST?: No  Service Ordered MSW?: No  Service Ordered HHA?: No  Following Physician: Valentin Buck MD  Following Physician Phone: [phone]  Overseeing Physician: Dahlia Rhodes APRN  (Required for Residents Only)  Agreeable to Follow? Yes  Date/Time of Call 04/25/25 09:21 EDT, Spoke with: per Dr Buck, surgeon order in [facility].    Care Coordination  Same Day SOC?:   Primary Care Physician: Dahlia Rhodes APRN  Primary Care Physician Phone: [phone]  Primary Care Physician Address: [address] [place] [address]  Visit Instructions: N/A  Service Discharge Location Type: Home  Service Facility Name: N/A  Service Floor Facility: N/A  Service Room No: N/A    Demographics  Patient Last Name: Armen  Patient First Name: Brain  Language/Communication Barrier: none  Service Address: [address] [place] [address] [address]  [address]  Service City: [place]  Service State: [place]  Service Zip: [address]  Service Home Phone: [phone]  Other Phone Numbers:   Telephone Information:   Mobile [phone]     Emergency Contact:   Extended Emergency Contact Information  Primary Emergency Contact: YOSVANY HUI  Home Phone: [phone]  Mobile Phone: [phone]  Relation: Significant Other    Admission Information  Admit Date: 4/25/2025  Patient Status at Discharge: Hospital outpatient surgery  Admitting Diagnosis: Avascular necrosis of bone of left hip [M87.052]    Caregiver Information  Caregiver First Name:   Caregiver Last Name:   Caregiver Relationship to Patient:   Caregiver Phone Number:   Caregiver Notes:     HITECH  [employer]  HIGHTECH: HI TECH - FRESH ORTHO  Procedure: LTH  Date of Procedure: 04/25/2025  Precautions: Anterior hip precautions  Surgeon: Valentin Buck MD      END PATIENT REGISTRATION INFORMATION    Start PACC Summary    Additional Comments: Call patient's mobile or wife's mobile to set up visits.    END PACC Summary    Discharge Date: Pending    Referral Source: [facility]    Signed By: Kimberly Lackey RN, 4/25/2025, 09:21 EDT     Date/Time: 04/25/25 09:21 EDT    End PACC Note

## 2025-04-25 NOTE — ANESTHESIA PREPROCEDURE EVALUATION
Anesthesia Evaluation     Patient summary reviewed and Nursing notes reviewed   NPO Solid Status: > 8 hours  NPO Liquid Status: > 2 hours           Airway   Mallampati: II  TM distance: >3 FB  Neck ROM: full  No difficulty expected  Dental - normal exam     Pulmonary    Cardiovascular     ECG reviewed    (+) hypertension      Neuro/Psych  (+) psychiatric history Anxiety and Depression  GI/Hepatic/Renal/Endo      Musculoskeletal     Abdominal    Substance History      OB/GYN          Other        ROS/Med Hx Other: Previous grade I view                Anesthesia Plan    ASA 2     general     (I have reviewed the patient's history and chart with the patient, including all pertinent laboratory results and imaging. I have explained the risks of anesthesia including but not limited to dental damage, corneal abrasion, nerve injury, MI, stroke, aspiration, and death. Patient has agreed to proceed.      There were no vitals taken for this visit.  )  intravenous induction     Anesthetic plan, risks, benefits, and alternatives have been provided, discussed and informed consent has been obtained with: patient.    CODE STATUS:

## 2025-04-25 NOTE — OP NOTE
Operative Report        Facility: [facility]  Patient Name: Brain Ayers  YOB: 1980  Date: 4/25/2025  Medical Record Number: [id number]       Preoperative diagnosis: left  Hip avascular necrosis     Postoperative diagnosis:  left Hip  avascular necrosis     Surgery performed: left Total hip arthroplasty    Surgical Approach: Hip Direct Anterior (Smith-Lyons)      Implants:    Implant Name Type Inv. Item Serial No.  Lot No. LRB No. Used Action   DEV CONTRL TISS STRATAFIX SPIRAL MNCRYL UD 3/0 PLS 30CM - [id number] Implant DEV CONTRL TISS STRATAFIX SPIRAL MNCRYL UD 3/0 PLS 30CM  [employer]  DIV OF J [employer] J [id number] Left 1 Implanted   DEV WND/CLS CONTRL TISS STRATAFIX SPIRAL PDS PLS CT1 0 30CM - [id number] Implant DEV WND/CLS CONTRL TISS STRATAFIX SPIRAL PDS PLS CT1 0 30CM  [employer]  DIV OF J AND J [id number] Left 1 Implanted   SHLL ACET OSSEOTI G7 4HL SZF 54MM - [id number] Implant SHLL ACET OSSEOTI G7 4HL SZF 54MM  [employer] [id number] Left 1 Implanted   LINER ACET G7 VIVACIT/E NTRL HXPE SZF 36MM - [id number] Implant LINER ACET G7 VIVACIT/E NTRL HXPE SZF 36MM  [employer] [id number] Left 1 Implanted   STEM FEM/HIP AVENIRCOMPLETE COLAR HI/OFFST HA SZ5 - [id number] Implant STEM FEM/HIP AVENIRCOMPLETE COLAR HI/OFFST HA SZ5  [employer] [id number] Left 1 Implanted   HD FEM/HIP BIOLOX/DELTA CERAM 12/00A39YL MIN3.5MM - [id number] Implant HD FEM/HIP BIOLOX/DELTA CERAM 12/00V31QR MIN3.5MM  [employer] [id number] Left 1 Implanted           Surgeon: Valentin Buck MD      Assistant: Cecilia Colon     Assistants were needed for the procedure to help with patient positioning, prepping and draping, retraction throughout the procedure and closure at the end of the case. Their assistance was vital to the success of this case.      Anesthesia: General     Estimated blood loss: 200 mL     Drains: None     Complications: None    Specimens: None     Disposition: The patient will be  transferred back to the Med-Surg/Ortho floor postoperatively and will be weightbearing as tolerated.  The patient will be started on aspirin for DVT prophylaxis and will mobilize with physical therapy likely tomorrow.  Once medically stable patient will be discharged home or to an outpatient facility per the primary team and family.     Indications for procedure: This is a pleasant 43 yo male with longstanding hip pain due to severe osteoarthritis.  Patient has failed conservative management including anti-inflammatories, physical therapy, activity modification, and gait assistance.  Patient wishes to proceed with a left total hip arthroplasty.  I discussed the risks in detail preoperatively. Risks  include but are not limited to bleeding, infection, fracture, dislocation, blood clots, damage to nearby nerves or blood vessels, death, loosening, leg length inequality, infection from a blood transfusion, pneumonia, heart attack, stroke, liver or kidney failure, possible future procedures/surgeries. The patient  wish to proceed with surgery all questions were answered.     Description of procedure:     After identification in the holding area, consent was signed and the left hip was marked.  The patient was brought to the operating theater.  After induction of anesthesia, the patient received preoperative antibiotics and tranexamic acid.  The patient was placed in a [place] table in a supine position and the left lower extremity was prepped and draped free.  A timeout was performed identifying correct patient, surgical site, surgical procedure, antibiotics given and implants available.  Standard anterior approach to the hip was performed.  I used a clean knife to incise the tensor fascia.  I coagulated the circumflex vessels with the Werewolf.  I retracted the rectus muscle medially coming down to the anterior capsule.  An inverted T-capsulotomy was made and the capsule was incised, tagged and saved for later repair.  I  then continued with a femoral neck osteotomy with a sagittal saw which was made based upon preoperative templating and intraoperative measurements for leg length.  A corkscrew was inserted into the femoral head and the head was removed.  The head ball measured about 50 mm.  And showed consistency with avascular necrosis.  Attention was then turned to the acetabulum which was then exposed.  The foveal and labral contents were excised.  The C-arm was brought in in order to ream under x-ray guidance to ensure appropriate depth and position.  The acetabulum was reamed with hemispherical reamers to size 53 mm.  This brought me down to good bleeding cancellous bone. The acetabulum was copiously irrigated normal sterile saline to remove any remaining debris.    A real 54 mm cup was impacted under fluoroscopic guidance in 40 degrees of abduction and 20 degrees of forward flexion/anteversion.  Good press-fit was achieved.  The cup was copiously irrigated with normal sterile saline.  The final neutral liner for a 36 mm head ball was then impacted into place.  Attention was then turned back to the femur.  The hook was placed on the lateral femur.  The leg was externally rotated, extended and adducted.  The piriformis  capsule was released off the femur and the femur was elevated into the wound.  I used a rongeur to remove some lateral cortical neck bone.  I then lateralized with a rasp.  I then began sequentially broaching  up to a size 5 stem.  This brought me down to good cortical contact with rotational stability.  Calcar planer was used as needed.  I then trialed with a standard neck and a -3.5 head ball.  The hip was reduced.  Stability was checked and found to be okay but felt high offset would be needed. Fluoroscopy confirmed good size/fill, position of the femoral stem, with near equal offset and leg lengths.  The hip was dislocated.  Trial components were removed.  The final size 5 stem with high offset neck and 36 mm  -3.5 head ball were impacted.  The hip was relocated.  Again stability was checked and was excellent. Final x-rays were taken and showed excellent implant position, leg lengths, offset and no intraoperative fracture.  The hip was copiously irrigated with Irrisept, dilute Betadine and experience pulse lavage with normal sterile saline.  The capsule was then closed with #2 Ethibond suture.  I injected the anterior capsule with about 10 mL of the hip ortho cocktail mix. The circumflex vessels were recoagulated with the werewolf. Excellent hemostasis was maintained.  Deep fascia was closed using a running Stratafix and 0 Vicryl suture.  Additional hip Ortho cocktail mix was administered and deep subcutaneous tissues were closed using 2-0 Vicryl and 3-0 Stratafix was used for the skin.  Exofin fusion was placed on the incision.  Sterile dressings were applied prior to drapes being removed.   All sponge, needles, and instrument counts were correct at the conclusion of the procedure. The patient tolerated procedure well and was transferred from the operating room to the PACU vital signs stable.

## 2025-04-28 DIAGNOSIS — M87.052 AVASCULAR NECROSIS OF BONE OF LEFT HIP: ICD-10-CM

## 2025-04-28 RX ORDER — OXYCODONE AND ACETAMINOPHEN 7.5; 325 MG/1; MG/1
1-2 TABLET ORAL EVERY 4 HOURS PRN
Qty: 28 TABLET | Refills: 0 | Status: SHIPPED | OUTPATIENT
Start: 2025-04-28 | End: 2025-05-01 | Stop reason: SDUPTHER

## 2025-04-28 NOTE — TELEPHONE ENCOUNTER
Please advise   [phone]    Sx date 04/25/2025 - Left Anterior TOTAL HIP ARTHROPLASTY     Last FD 04/25/2025  Last OV 04/17/2025  Next OV 05/08/2025

## 2025-04-28 NOTE — SIGNIFICANT NOTE
Post op day 2: Phone Call     Discharge Instructions:    Ask patient about his or her discharge instructions:  Patient Confirmed Understanding    2.   What, if any, recommendations, teaching, or interventions did you provide?   Not Applicable    Health status:    3.   Pain controlled?    Yes    4.   Recommended interventions:    Not Applicable   If YES, comment Not Applicable    5.   Incision/dressing status:   Patient states Incision is Clean without Redness, Drainage, Odor    6.   EMILY - Green light blinking?   Yes    7.   Difficulties urination?    No    8.   Last BM?  Date: {4/28/25  If no BM by day 3-recommend OTC suppository or fleets enema: Not Applicable    Medications:    9.   Reviewed Medications with Patient/Family/Caregiver?   Yes     10. Patient taking medications as prescribed?   Yes    11. If not taking medications as prescribed, note specific medicine(s) and reason for each:    Not Applicable    Hospital Follow Up Plan:    12. Follow up Appointment with Orthopedic surgeon:   Patient confirms follow up appointment    13. Home Care ordered at discharge?  Yes         14. Home Care started, or contact made?   Yes  If no, action taken: Not Applicable    15. DME obtained/used in home?    Yes    16. Using IS?   Not Applicable    17. Other information:    Not Applicable

## 2025-05-01 DIAGNOSIS — M87.052 AVASCULAR NECROSIS OF BONE OF LEFT HIP: ICD-10-CM

## 2025-05-01 RX ORDER — OXYCODONE AND ACETAMINOPHEN 7.5; 325 MG/1; MG/1
1-2 TABLET ORAL EVERY 4 HOURS PRN
Qty: 28 TABLET | Refills: 0 | Status: SHIPPED | OUTPATIENT
Start: 2025-05-01 | End: 2025-05-05 | Stop reason: SDUPTHER

## 2025-05-05 DIAGNOSIS — M87.052 AVASCULAR NECROSIS OF BONE OF LEFT HIP: Primary | ICD-10-CM

## 2025-05-05 RX ORDER — OXYCODONE AND ACETAMINOPHEN 7.5; 325 MG/1; MG/1
1-2 TABLET ORAL EVERY 4 HOURS PRN
Qty: 28 TABLET | Refills: 0 | Status: CANCELLED | OUTPATIENT
Start: 2025-05-05

## 2025-05-05 RX ORDER — OXYCODONE AND ACETAMINOPHEN 5; 325 MG/1; MG/1
1 TABLET ORAL EVERY 6 HOURS PRN
Qty: 20 TABLET | Refills: 0 | Status: SHIPPED | OUTPATIENT
Start: 2025-05-05 | End: 2025-05-08 | Stop reason: SDUPTHER

## 2025-05-08 ENCOUNTER — OFFICE VISIT (OUTPATIENT)
Dept: ORTHOPEDIC SURGERY | Facility: CLINIC | Age: 45
End: 2025-05-08
Payer: COMMERCIAL

## 2025-05-08 VITALS — WEIGHT: 204.9 LBS | BODY MASS INDEX: 29.33 KG/M2 | TEMPERATURE: 98.4 F | HEIGHT: 70 IN

## 2025-05-08 DIAGNOSIS — Z96.642 STATUS POST TOTAL REPLACEMENT OF LEFT HIP: Primary | ICD-10-CM

## 2025-05-08 DIAGNOSIS — R52 PAIN: ICD-10-CM

## 2025-05-08 DIAGNOSIS — M87.052 AVASCULAR NECROSIS OF BONE OF LEFT HIP: ICD-10-CM

## 2025-05-08 PROCEDURE — 99024 POSTOP FOLLOW-UP VISIT: CPT | Performed by: ORTHOPAEDIC SURGERY

## 2025-05-08 RX ORDER — OXYCODONE AND ACETAMINOPHEN 5; 325 MG/1; MG/1
1 TABLET ORAL EVERY 8 HOURS PRN
Qty: 12 TABLET | Refills: 0 | Status: SHIPPED | OUTPATIENT
Start: 2025-05-08

## 2025-05-08 NOTE — PROGRESS NOTES
Brain Ayers : 1980 MRN: [id number] DATE: 2025    DIAGNOSIS: 2 week follow up Left total hip     SUBJECTIVE:Patient returns today for 2 week follow up of Left total hip replacement. Patient reports doing well with no unusual complaints. Appears to be progressing appropriately.    OBJECTIVE:   Exam:. The incision is healing appropriately. No sign of infection. Range of motion is progressing as expected. The calf is soft and nontender with a negative Homans sign.    DIAGNOSTIC STUDIES  Xrays: 2 views of the Left hip (AP pelvis and lateral Left hip) were ordered and reviewed for evaluation of recent hip replacement. They demonstrate a well positioned, well aligned hip replacement without complicating factors noted. In comparison with previous films there has been interval implant placement.    ASSESSMENT: 2 week status post Left hip replacement.    PLAN: 1) Dressing removed and steri strips applied   2) PT exercises   3) Continue ice PRN   4) WBAT   5) aspirin 81 mg orally every day for 4 weeks   6) Follow up in 4 weeks with repeat Xrays of Left hip (2views)    2-week total hip information packet given and discussed including dental and GI procedure antibiotic prophylaxis.     Valentin Buck MD  2025

## 2025-05-16 ENCOUNTER — TREATMENT (OUTPATIENT)
Dept: PHYSICAL THERAPY | Facility: CLINIC | Age: 45
End: 2025-05-16
Payer: COMMERCIAL

## 2025-05-16 DIAGNOSIS — M25.652 DECREASED RANGE OF LEFT HIP MOVEMENT: ICD-10-CM

## 2025-05-16 DIAGNOSIS — M87.052 AVASCULAR NECROSIS OF BONE OF LEFT HIP: Primary | ICD-10-CM

## 2025-05-16 DIAGNOSIS — Z96.642 S/P TOTAL LEFT HIP ARTHROPLASTY: ICD-10-CM

## 2025-05-16 DIAGNOSIS — R26.9 GAIT DISTURBANCE: ICD-10-CM

## 2025-05-16 DIAGNOSIS — R29.898 DECREASED STRENGTH OF LOWER EXTREMITY: ICD-10-CM

## 2025-05-16 PROCEDURE — 97110 THERAPEUTIC EXERCISES: CPT

## 2025-05-16 PROCEDURE — 97530 THERAPEUTIC ACTIVITIES: CPT

## 2025-05-16 PROCEDURE — 97161 PT EVAL LOW COMPLEX 20 MIN: CPT

## 2025-05-16 NOTE — PROGRESS NOTES
Physical Therapy Initial Evaluation and Plan of Care                                               [address], [address]                                                           [place], [place]                                                         [phone]    Patient: Brain Ayers   : 1980  Diagnosis/ICD-10 Code:  No primary diagnosis found.  Referring practitioner: Valentin Buck MD  Date of Initial Visit: 2025  Today's Date: 2025  Patient seen for 1 sessions           Subjective Questionnaire: LEFS: 39      Subjective Evaluation    History of Present Illness  Date of surgery: 2025  Mechanism of injury: Pt is 3 weeks s/p left LISA with an anterior approach.     He reports worsening left hip pain a few months after undergoing a total hip arthroplasty on his right which was performed on 24. The pain became debilitating, limiting his ability to walk and stand.     MRI from 2/3/25   IMPRESSION:     1. Osteonecrosis of the left femoral head involving nearly the entirety  of the subchondral bone with associated comminuted subchondral fracture  of the anterior left femoral head, mild associated cortical  irregularity, and bone marrow edema like signal extending into the  femoral neck.  2. Focal osteonecrosis in the intertrochanteric proximal left femur.  3. Nondisplaced chest type fracture of the posterior/superior left  acetabulum with associated patchy bone marrow edema-like signal.  4. Mild to moderate left hip joint chondromalacia with full-thickness or  near full-thickness chondral loss at the anterior left femoral head and  acetabulum and a large joint effusion.  5. Right LISA with associated hardware artifact.  6. Mildly prominent left external iliac/pelvic sidewall lymph node,  possibly reactive.    Following his procedure, he reports increased pain compared to his last replacement. He continues to use a STC for ambulation around his home and in the community due to  pain with each step through his L LE and decreased stability in standing. He is not able to stand for long periods of time without increased pain. His pain is described as a constant dull ache felt through the lateral aspect of his left hip. With walking or standing, he feels a deep, dull pain that comes and goes with each step. He also experiences occasional sharp pains with accidental jolting of his L LE such as when stubbing his toe.     He underwent 2 weeks of home health PT which went well. He continues to perform his exercises. He also relies on tylenol and application of ice for pain relief.     He would like to return to golfing and be able to work in person at his office, which would require walking for about a 1/2 mile from the parking lot.     PMHx is significant for anxiety, AVN of left hip, AVN of right hip with R LISA performed on 24, and HTN      Patient Occupation: desk work Quality of life: good    Pain  Current pain ratin  At worst pain ratin  Location: left hip; lateral aspect and felt deep within joint  Quality: sharp, dull ache and pulling  Relieving factors: medications and ice  Aggravating factors: movement, ambulation, squatting, stairs, standing and prolonged positioning  Progression: improved    Social Support  Lives in: one-story house    Diagnostic Tests  X-ray: abnormal    Patient Goals  Patient goal: prolonged walking, golfing           Objective          Observations   Left Hip  Positive for incision.       Palpation   Left   No palpable tenderness to the gluteus sabina, gluteus medius, iliopsoas, piriformis, quadratus lumborum and rectus femoris.   Tenderness of the gluteus medius.     Tenderness     Left Hip   Tenderness in the greater trochanter. No tenderness in the ASIS and PSIS.     Neurological Testing     Sensation     Hip   Left Hip   Intact: light touch    Right Hip   Intact: light touch    Active Range of Motion   Left Hip   Flexion: 93 degrees   Abduction: 24  degrees   External rotation (90/90): Left hip active external rotation 90/90: not tested due to post-op resrictions.   Internal rotation (90/90): 15 degrees     Right Hip   Flexion: 110 degrees   Abduction: 48 degrees   Left Knee   Normal active range of motion    Right Knee   Normal active range of motion  Left Ankle/Foot   Normal active range of motion    Right Ankle/Foot   Normal active range of motion    Strength/Myotome Testing     Left Hip   Planes of Motion   Flexion: 3 (supine)    Right Hip   Planes of Motion   Flexion: 4    Left Knee   Flexion: 4+  Extension: 5    Right Knee   Flexion: 5  Extension: 5  Quadriceps contraction: good    Left Ankle/Foot   Dorsiflexion: 5    Ambulation     Comments   Pt demonstrates symmetrical gait mechanics with a STC on even surfaces in the clinic for approximately 25 feet. He displays a rigid trunk with decreased lateral trunk sway and pelvic rotation bilaterally. He reported pain in his L hip with each step and felt instability.           Assessment & Plan       Assessment  Impairments: abnormal gait, abnormal or restricted ROM, activity intolerance, impaired balance, impaired physical strength, lacks appropriate home exercise program, pain with function, safety issue and weight-bearing intolerance   Functional limitations: walking, uncomfortable because of pain, moving in bed, sitting and standing   Assessment details: Brain is a 43 y/o male reporting to OP PT for initial evaluation s/p left total hip arthroplasty performed by Dr. Buck on 4/25/25. He reports persisting pain felt along his lateral hip that is constant and described as a dull ache. With weight-bearing of any kind through his L LE, he experiences a severe dull pain, which makes standing and walking very difficult. He uses a STC for all ambulation outside his home. Upon evaluation, Brain presents with deficits in L hip range of motion and strength. He demonstrates decreased trunk sway and pelvic rotation  during ambulation with a cane, likely due to significant pain in his L hip. He is not able to perform a sit to stand transfer without shifting his weight off his affected limb. Skilled OP PT is deemed reasonable and necessary in order to address these deficits, limitations, and impairments and assist with improving his ability to perform ADLs and recreational activities without pain.   Prognosis: good    Goals  Plan Goals: Short Term: 2 weeks  1. Pt will be independent and compliant with initial HEP  2. Pt will ambulate on even surfaces without an AD with symmetrical gait mechanics WNL for indication of decreased pain and improved safety when walking in his home and in the community  3. Pt will demonstrate improved L hip AROM by 10 degrees for flexion and abduction for indication of appropriate healing and decreased stiffness    Long Term: 4 weeks  1. Pt will report 50% improvement in L hip pain  2. Pt will report ability to walk to/ from the parking lot into his office for work without significant pain in his L hip for indication of improved strength and stability   3. Pt will report ability to sleep in his bed without significant sleep disturbance for indication of decreased L hip pain and improved quality of life  4. Pt will demonstrate >/= 4/5 strength in his L hip in all planes of motion for improved ability to perform ADLs    Plan  Therapy options: will be seen for skilled therapy services  Planned modality interventions: cryotherapy  Planned therapy interventions: abdominal trunk stabilization, balance/weight-bearing training, body mechanics training, functional ROM exercises, gait training, home exercise program, therapeutic activities, stretching, strengthening and manual therapy  Frequency: 1x week  Duration in weeks: 4  Treatment plan discussed with: patient        Manual Therapy:    0     mins  22171;  Therapeutic Exercise:    10     mins  08004;     Neuromuscular Aj:    0    mins  97077;     Therapeutic Activity:     10     mins  72343;     Gait Trainin     mins  39465;     Ultrasound:     0     mins  01791;    Electrical Stimulation:    0     mins  28022 ( );  Dry Needling     0     mins self-pay    Timed Treatment:   20   mins   Total Treatment:     37   mins    PT SIGNATURE: Sincere Limon PT, DPT  [place] License #: [id number]   Sincere Limon PT, 25, 10:33 AM EDT  DATE TREATMENT INITIATED: 2025    Initial Certification  Certification Period: 2025  I certify that the therapy services are furnished while this patient is under my care.  The services outlined above are required by this patient, and will be reviewed every 90 days.     PHYSICIAN: Valentin Buck MD      DATE:     Please sign and return via fax to [phone].. Thank you, [facility].

## 2025-06-05 ENCOUNTER — TELEPHONE (OUTPATIENT)
Dept: ORTHOPEDIC SURGERY | Facility: CLINIC | Age: 45
End: 2025-06-05

## 2025-06-05 ENCOUNTER — OFFICE VISIT (OUTPATIENT)
Dept: ORTHOPEDIC SURGERY | Facility: CLINIC | Age: 45
End: 2025-06-05
Payer: COMMERCIAL

## 2025-06-05 VITALS — HEIGHT: 70 IN | BODY MASS INDEX: 28.66 KG/M2 | TEMPERATURE: 98.6 F | WEIGHT: 200.2 LBS

## 2025-06-05 DIAGNOSIS — Z96.642 STATUS POST TOTAL REPLACEMENT OF LEFT HIP: Primary | ICD-10-CM

## 2025-06-05 DIAGNOSIS — R52 PAIN: ICD-10-CM

## 2025-06-05 PROCEDURE — 99024 POSTOP FOLLOW-UP VISIT: CPT | Performed by: ORTHOPAEDIC SURGERY

## 2025-06-05 NOTE — PROGRESS NOTES
Brain Ayers : 1980 MRN: [id number] DATE: 2025    DIAGNOSIS: 6 week follow up left total hip     SUBJECTIVE:Patient returns today for 6 week follow up of left total hip replacement. Patient reports doing well with no unusual complaints. Appears to be progressing appropriately and is off a cane    OBJECTIVE:   Exam:. The incision is healed. No sign of infection. Range of motion is progressing as expected. The calf is soft and nontender with a negative Homans sign. Strength progressing    DIAGNOSTIC STUDIES  Xrays: 2 views of the left  hip (AP pelvis and lateral right hip) were ordered and reviewed for evaluation of recent hip replacement. They demonstrate a well positioned, well aligned hip replacement without complicating factors noted. In comparison with previous films there has been interval implant placement.    ASSESSMENT: 6 week status post left  hip replacement.    PLAN: 1) Activity as tolerated   2) Continue hip strengthening exercises    3) ok to stop DVT ppx    4) Follow up in 8 weeks if doing well ok to see at 1 year follow with repeat Xrays of  hip (2views AP Pelvis and lateral  left hip)    Antibiotics before dental and GI procedures discussed.     Valentin Buck MD  2025

## 2025-06-06 ENCOUNTER — TELEPHONE (OUTPATIENT)
Dept: ORTHOPEDIC SURGERY | Facility: CLINIC | Age: 45
End: 2025-06-06
Payer: COMMERCIAL

## 2025-06-09 ENCOUNTER — TELEPHONE (OUTPATIENT)
Dept: ORTHOPEDIC SURGERY | Facility: CLINIC | Age: 45
End: 2025-06-09
Payer: COMMERCIAL

## 2025-06-17 ENCOUNTER — DOCUMENTATION (OUTPATIENT)
Dept: PHYSICAL THERAPY | Facility: CLINIC | Age: 45
End: 2025-06-17
Payer: COMMERCIAL

## (undated) DEVICE — APPL DURAPREP IODOPHOR APL 26ML

## (undated) DEVICE — QUINCKE POINT SPINAL NEEDLE, BLACK: Brand: RELI

## (undated) DEVICE — SUT ETHIB 2 CV V37 MS/4 30IN MX69G

## (undated) DEVICE — STPLR SKIN VISISTAT WD 35CT

## (undated) DEVICE — DRAPE,REIN 53X77,STERILE: Brand: MEDLINE

## (undated) DEVICE — TRAP FLD MINIVAC MEGADYNE 100ML

## (undated) DEVICE — NEEDLE, QUINCKE, 18GX3.5": Brand: MEDLINE

## (undated) DEVICE — WEREWOLF FASTSEAL 6.0 HEMOSTASIS WAND: Brand: FASTSEAL 6.0 HEMOSTASIS WAND

## (undated) DEVICE — PICO 14 10CM X 20CM US: Brand: PICO 14

## (undated) DEVICE — SOL ISO/ALC 70PCT 4OZ

## (undated) DEVICE — THE STERILE LIGHT HANDLE COVER IS USED WITH STERIS SURGICAL LIGHTING AND VISUALIZATION SYSTEMS.

## (undated) DEVICE — MAT FLR ABSORBENT LG 4FT 10 2.5FT

## (undated) DEVICE — BLD DEBAKY BEAVER MAMMATOME 8MM

## (undated) DEVICE — ANTIBACTERIAL UNDYED BRAIDED (POLYGLACTIN 910), SYNTHETIC ABSORBABLE SUTURE: Brand: COATED VICRYL

## (undated) DEVICE — TOWEL,OR,DSP,ST,BLUE,STD,4/PK,20PK/CS: Brand: MEDLINE

## (undated) DEVICE — GLV SURG BIOGEL LTX PF 8

## (undated) DEVICE — PREP SOL POVIDONE/IODINE BT 4OZ

## (undated) DEVICE — DECANTER BAG 9": Brand: MEDLINE INDUSTRIES, INC.

## (undated) DEVICE — NEEDLE, QUINCKE, 20GX3.5": Brand: MEDLINE

## (undated) DEVICE — DRAPE,U/ SHT,SPLIT,PLAS,STERIL: Brand: MEDLINE

## (undated) DEVICE — GLV SURG SENSICARE PI LF PF 8 GRN STRL

## (undated) DEVICE — SYR LUERLOK 30CC

## (undated) DEVICE — SKIN PREP TRAY 4 COMPARTM TRAY: Brand: MEDLINE INDUSTRIES, INC.

## (undated) DEVICE — PK ANT HIP 40

## (undated) DEVICE — SPNG LAP 18X18IN LF STRL PK/5

## (undated) DEVICE — SYS CLS SKIN PREMIERPRO EXOFINFUSION 22CM

## (undated) DEVICE — GLV SURG SENSICARE PI PF LF 7 GRN STRL

## (undated) DEVICE — MEDI-VAC YANKAUER SUCTION HANDLE W/BULBOUS TIP: Brand: CARDINAL HEALTH

## (undated) DEVICE — SPNG GZ WOVN 4X4IN 12PLY 10/BX STRL

## (undated) DEVICE — 3M™ IOBAN™ 2 ANTIMICROBIAL INCISE DRAPE 6640EZ: Brand: IOBAN™ 2

## (undated) DEVICE — PATIENT RETURN ELECTRODE, SINGLE-USE, CONTACT QUALITY MONITORING, ADULT, WITH 9FT CORD, FOR PATIENTS WEIGING OVER 33LBS. (15KG): Brand: MEGADYNE

## (undated) DEVICE — 450 ML BOTTLE OF 0.05% CHLORHEXIDINE GLUCONATE IN 99.95% STERILE WATER FOR IRRIGATION, USP AND APPLICATOR.: Brand: IRRISEPT ANTIMICROBIAL WOUND LAVAGE

## (undated) DEVICE — PICO 7 10CM X 30CM: Brand: PICO™ 7

## (undated) DEVICE — SYS SKIN EXOFIN WND CLS 4X22CM

## (undated) DEVICE — SPONGE,LAP,18"X18",DLX,XR,ST,5/PK,40/PK: Brand: MEDLINE

## (undated) DEVICE — PENCL SMOKE/EVAC MEGADYNE TELESCP 10FT

## (undated) DEVICE — DUAL CUT SAGITTAL BLADE

## (undated) DEVICE — YANKAUER,BULB TIP,W/O VENT,RIGID,STERILE: Brand: MEDLINE

## (undated) DEVICE — SYS IRR SURGIPHOR A/MIC RTU BO PVPI 450ML STRL